# Patient Record
Sex: FEMALE | Race: WHITE | HISPANIC OR LATINO
[De-identification: names, ages, dates, MRNs, and addresses within clinical notes are randomized per-mention and may not be internally consistent; named-entity substitution may affect disease eponyms.]

---

## 2017-03-13 ENCOUNTER — APPOINTMENT (OUTPATIENT)
Dept: UROGYNECOLOGY | Facility: CLINIC | Age: 77
End: 2017-03-13

## 2017-03-27 ENCOUNTER — APPOINTMENT (OUTPATIENT)
Dept: UROGYNECOLOGY | Facility: CLINIC | Age: 77
End: 2017-03-27

## 2017-03-27 DIAGNOSIS — N81.11 CYSTOCELE, MIDLINE: ICD-10-CM

## 2017-03-27 DIAGNOSIS — N99.3 PROLAPSE OF VAGINAL VAULT AFTER HYSTERECTOMY: ICD-10-CM

## 2018-04-29 ENCOUNTER — EMERGENCY (EMERGENCY)
Facility: HOSPITAL | Age: 78
LOS: 1 days | Discharge: HOME | End: 2018-04-29
Attending: EMERGENCY MEDICINE
Payer: MEDICARE

## 2018-04-29 VITALS
RESPIRATION RATE: 20 BRPM | SYSTOLIC BLOOD PRESSURE: 157 MMHG | TEMPERATURE: 98 F | DIASTOLIC BLOOD PRESSURE: 79 MMHG | HEART RATE: 70 BPM | OXYGEN SATURATION: 100 % | WEIGHT: 110.89 LBS

## 2018-04-29 VITALS
DIASTOLIC BLOOD PRESSURE: 74 MMHG | RESPIRATION RATE: 18 BRPM | HEART RATE: 72 BPM | SYSTOLIC BLOOD PRESSURE: 138 MMHG | OXYGEN SATURATION: 99 %

## 2018-04-29 DIAGNOSIS — Z90.710 ACQUIRED ABSENCE OF BOTH CERVIX AND UTERUS: Chronic | ICD-10-CM

## 2018-04-29 PROCEDURE — 12013 RPR F/E/E/N/L/M 2.6-5.0 CM: CPT

## 2018-04-29 PROCEDURE — 73562 X-RAY EXAM OF KNEE 3: CPT

## 2018-04-29 PROCEDURE — 73562 X-RAY EXAM OF KNEE 3: CPT | Mod: 26,RT

## 2018-04-29 PROCEDURE — 90715 TDAP VACCINE 7 YRS/> IM: CPT

## 2018-04-29 PROCEDURE — 70450 CT HEAD/BRAIN W/O DYE: CPT

## 2018-04-29 PROCEDURE — 99283 EMERGENCY DEPT VISIT LOW MDM: CPT | Mod: 25

## 2018-04-29 PROCEDURE — 70450 CT HEAD/BRAIN W/O DYE: CPT | Mod: 26

## 2018-04-29 PROCEDURE — 90471 IMMUNIZATION ADMIN: CPT

## 2018-04-29 RX ORDER — TETANUS TOXOID, REDUCED DIPHTHERIA TOXOID AND ACELLULAR PERTUSSIS VACCINE, ADSORBED 5; 2.5; 8; 8; 2.5 [IU]/.5ML; [IU]/.5ML; UG/.5ML; UG/.5ML; UG/.5ML
0.5 SUSPENSION INTRAMUSCULAR ONCE
Qty: 0 | Refills: 0 | Status: COMPLETED | OUTPATIENT
Start: 2018-04-29 | End: 2018-04-29

## 2018-04-29 RX ADMIN — TETANUS TOXOID, REDUCED DIPHTHERIA TOXOID AND ACELLULAR PERTUSSIS VACCINE, ADSORBED 0.5 MILLILITER(S): 5; 2.5; 8; 8; 2.5 SUSPENSION INTRAMUSCULAR at 13:13

## 2018-04-29 NOTE — ED PROVIDER NOTE - PHYSICAL EXAMINATION
Gen: NAD, AOx3  Head: abrasion/laceration present to right forehead  HEENT: PERRL, oral mucosa moist, normal conjunctiva, oropharynx clear without exudate or erythema  Lung: CTAB, no respiratory distress, no wheezing, rales, rhonchi  CV: normal s1/s2, rrr, no murmurs, Normal perfusion, pulses 2+ throughout  Abd: soft, NTND, no CVA tenderness  MSK: No edema, no visible deformities, full range of motion in all 4 extremities  Neuro: No cervical midline tenderness, CN II-XII grossly intact, No focal neurologic deficits, strength 5/5 throughout  Skin: 3 superficial 1cm lacerations and 1 2cm deeper laceration to frontal right scalp, not actively bleeding, right knee small abrasions present, no ecchymoses  Psych: normal affect Gen: NAD, AOx3  Head: abrasion/laceration present to right forehead  HEENT: PERRL, oral mucosa moist, normal conjunctiva, oropharynx clear without exudate or erythema  Lung: CTAB, no respiratory distress, no wheezing, rales, rhonchi  CV: normal s1/s2, rrr, no murmurs, Normal perfusion, pulses 2+ throughout  Abd: soft, NTND, no CVA tenderness  MSK: No edema, no visible deformities, full range of motion in all 4 extremities  Neuro: No cervical midline tenderness, CN II-XII grossly intact, No focal neurologic deficits, strength 5/5 throughout  Skin: 3 superficial 1cm lacerations and 1 2cm deeper laceration to frontal right scalp, not actively bleeding, right knee small abrasions present, no ecchymoses  Psych: normal affect  Attending Pickard: Gen: NAD, heent: atrauamtic, eomi, perrla, mmm, op pink, uvula midline, neck; nttp, no nuchal rigidity, chest: nttp, no crepitus, cv: rrr, no murmurs, lungs: ctab, abd: soft, nontender, nondistended, no peritoneal signs, +BS, no guarding, ext: wwp, neg homans, skin: multiple abrasions, laceration to right forehead, neuro: awake and alert, following commands, speech clear, sensation and strength intact, no focal deficits

## 2018-04-29 NOTE — ED PROVIDER NOTE - MEDICAL DECISION MAKING DETAILS
78yo female with head injury and right knee pain s/p mechanical fall, will check CT, xrays, give tetanus, laceration repair, DC. Jennifer Turner DO 76yo female with head injury and right knee pain s/p mechanical fall, will check CT, xrays, give tetanus, laceration repair, DC. Jennifer Turner DO  Attending Neeraj: 76 y/o female s/p mechanical trip and fall. no loc. upon arrival pt awake and alert without focal neurologic deficits. abd soft and nontender and pt hemodynamically stable. no midline neck ttp. will obtain ct head, update tetanus, repair laceration and give wound care instructions

## 2018-04-29 NOTE — ED PROVIDER NOTE - PLAN OF CARE
1. Follow up with your primary care physician within 2-3days for reevaluation.  2.  Return to the Emergency Department for worsening, progressive or any other concerning symptoms.   3.  Please take tylenol 650 mg every 4 hours as needed for pain. Please do not exceed more than 4,000mg of Tylenol in a day   4.  Please have your sutures removed in 5 days. You may have this done at your doctor's office, urgent care, or in the emergency department.

## 2018-04-29 NOTE — ED ADULT NURSE NOTE - OBJECTIVE STATEMENT
77 y.o F arrived to ED s/p mechanical fall outside onto asphalt. A&Ox3. pt accompanied by her daughters. pt daughter states pt tripped over her flip flop and hit R side of her body. impact to R side of forehead, has an abrasion to R forehead, endorses pain to R forehead and R knee. no LOC no blood thinners. able to ambulate s/p fall pmh hypothyroid, arthritis. respirations nonlabored, pt in no acute distress, abdomen soft nontender, neuro intact, moves all extremities. Denies CP, SOB, N/V/D, double vision. Safety and comfort provided maintained.

## 2018-04-29 NOTE — ED PROVIDER NOTE - OBJECTIVE STATEMENT
78yo female PMH hypothryoid, osteoporosis, arthritis p/w head injury and right knee pain s/p mechanical fall. Patient was wearing flip flops and tripped and fell onto her face and right knee, hit the asphalt. No loss of consciousness. Able to get up on her own and ambulated to house. No blurred vision or double vision, no chest pain or shortness of breath, no nausea, vomiting. Last tetanus unknown.

## 2018-04-29 NOTE — ED PROVIDER NOTE - CARE PLAN
Principal Discharge DX:	Laceration of face  Assessment and plan of treatment:	1. Follow up with your primary care physician within 2-3days for reevaluation.  2.  Return to the Emergency Department for worsening, progressive or any other concerning symptoms.   3.  Please take tylenol 650 mg every 4 hours as needed for pain. Please do not exceed more than 4,000mg of Tylenol in a day   4.  Please have your sutures removed in 5 days. You may have this done at your doctor's office, urgent care, or in the emergency department.  Secondary Diagnosis:	Knee pain, right

## 2018-05-01 ENCOUNTER — OUTPATIENT (OUTPATIENT)
Dept: OUTPATIENT SERVICES | Facility: HOSPITAL | Age: 78
LOS: 1 days | End: 2018-05-01
Payer: MEDICAID

## 2018-05-01 DIAGNOSIS — Z90.710 ACQUIRED ABSENCE OF BOTH CERVIX AND UTERUS: Chronic | ICD-10-CM

## 2018-05-01 PROCEDURE — G9001: CPT

## 2018-05-07 ENCOUNTER — EMERGENCY (EMERGENCY)
Facility: HOSPITAL | Age: 78
LOS: 1 days | Discharge: HOME | End: 2018-05-07
Attending: EMERGENCY MEDICINE
Payer: MEDICARE

## 2018-05-07 VITALS
DIASTOLIC BLOOD PRESSURE: 68 MMHG | OXYGEN SATURATION: 97 % | RESPIRATION RATE: 18 BRPM | SYSTOLIC BLOOD PRESSURE: 112 MMHG | HEART RATE: 82 BPM

## 2018-05-07 DIAGNOSIS — Z90.710 ACQUIRED ABSENCE OF BOTH CERVIX AND UTERUS: Chronic | ICD-10-CM

## 2018-05-07 PROCEDURE — G0463: CPT

## 2018-05-07 NOTE — ED PROVIDER NOTE - ENMT, MLM
(+) closed wound to right frontal head w/ sutures intact. no signs of infection, erythema, swelling, discharge or tenderness noted. Airway patent, Nasal mucosa clear. Mouth with normal mucosa. Throat has no vesicles, no oropharyngeal exudates and uvula is midline.

## 2018-05-07 NOTE — ED PROVIDER NOTE - ATTENDING CONTRIBUTION TO CARE
Attending MD Vega:   I personally have seen and examined this patient.  Physician assistant note reviewed and agree on plan of care and except where noted.  See HPI, PE, and MDM for details.       Attending MD Vega: A & O x 3, NAD, right forehead healing laceration ~3cm, no surrounding erythema, warmth or ttp, affect appropriate. neuro exam non focal with no motor or sensory deficits.       Pt here for suture removal right forehead, wound well healed, sutures removed as per procedure note. Ongoing wound care explained to patient

## 2018-05-07 NOTE — ED PROCEDURE NOTE - ATTENDING CONTRIBUTION TO CARE
Attending MD Vega: Risks, benefit and alternatives of procedure explained to patient and patient demonstrated verbal understanding and consent.  I was present during the key portions of the procedure.

## 2018-05-07 NOTE — ED PROCEDURE NOTE - CPROC ED SUTURE REMOV DETAILS1
The location identified, area draped and prepped as per norm, sterile technique maintained./right frontal

## 2018-05-07 NOTE — ED PROVIDER NOTE - MEDICAL DECISION MAKING DETAILS
78yo female aox3 suture removal s/p mechanical floor w/ laceration 04/29/18  No signs of neuro deficit  Suture removal  F/U with PCP. If symptoms, return to ED.

## 2018-05-07 NOTE — ED PROVIDER NOTE - OBJECTIVE STATEMENT
Patient is 78 yo female presents to ED requesting suture removal to head s/p head injury due to mechanical fall 04/29/18. Pt states she is feeling better and pain has resolved. Pt denies swelling to area, discharge, fever, chills, nausea, vomiting, numbness, weakness, dizziness, subjective neurosensory deficit or other associated symptoms.

## 2018-05-09 DIAGNOSIS — R69 ILLNESS, UNSPECIFIED: ICD-10-CM

## 2018-09-13 ENCOUNTER — INPATIENT (INPATIENT)
Facility: HOSPITAL | Age: 78
LOS: 1 days | Discharge: HOME | DRG: 552 | End: 2018-09-15
Attending: NEUROLOGICAL SURGERY | Admitting: NEUROLOGICAL SURGERY
Payer: MEDICARE

## 2018-09-13 VITALS
WEIGHT: 111.99 LBS | RESPIRATION RATE: 18 BRPM | TEMPERATURE: 98 F | OXYGEN SATURATION: 99 % | DIASTOLIC BLOOD PRESSURE: 71 MMHG | HEART RATE: 78 BPM | SYSTOLIC BLOOD PRESSURE: 131 MMHG

## 2018-09-13 DIAGNOSIS — Z90.710 ACQUIRED ABSENCE OF BOTH CERVIX AND UTERUS: Chronic | ICD-10-CM
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
## 2018-09-13 DIAGNOSIS — S12.01XA STABLE BURST FRACTURE OF FIRST CERVICAL VERTEBRA, INITIAL ENCOUNTER FOR CLOSED FRACTURE: ICD-10-CM

## 2018-09-13 DIAGNOSIS — M81.0 AGE-RELATED OSTEOPOROSIS WITHOUT CURRENT PATHOLOGICAL FRACTURE: ICD-10-CM

## 2018-09-13 DIAGNOSIS — E78.5 HYPERLIPIDEMIA, UNSPECIFIED: ICD-10-CM

## 2018-09-13 DIAGNOSIS — E03.9 HYPOTHYROIDISM, UNSPECIFIED: ICD-10-CM

## 2018-09-13 DIAGNOSIS — Z01.818 ENCOUNTER FOR OTHER PREPROCEDURAL EXAMINATION: ICD-10-CM

## 2018-09-13 DIAGNOSIS — E87.1 HYPO-OSMOLALITY AND HYPONATREMIA: ICD-10-CM

## 2018-09-13 LAB
ALBUMIN SERPL ELPH-MCNC: 4.5 G/DL — SIGNIFICANT CHANGE UP (ref 3.3–5)
ALP SERPL-CCNC: 59 U/L — SIGNIFICANT CHANGE UP (ref 40–120)
ALT FLD-CCNC: 24 U/L — SIGNIFICANT CHANGE UP (ref 10–45)
ANION GAP SERPL CALC-SCNC: 13 MMOL/L — SIGNIFICANT CHANGE UP (ref 5–17)
APPEARANCE UR: ABNORMAL
APTT BLD: 28 SEC — SIGNIFICANT CHANGE UP (ref 27.5–37.4)
AST SERPL-CCNC: 26 U/L — SIGNIFICANT CHANGE UP (ref 10–40)
BILIRUB SERPL-MCNC: 0.5 MG/DL — SIGNIFICANT CHANGE UP (ref 0.2–1.2)
BILIRUB UR-MCNC: NEGATIVE — SIGNIFICANT CHANGE UP
BLD GP AB SCN SERPL QL: NEGATIVE — SIGNIFICANT CHANGE UP
BUN SERPL-MCNC: 10 MG/DL — SIGNIFICANT CHANGE UP (ref 7–23)
CALCIUM SERPL-MCNC: 9.1 MG/DL — SIGNIFICANT CHANGE UP (ref 8.4–10.5)
CHLORIDE SERPL-SCNC: 95 MMOL/L — LOW (ref 96–108)
CO2 SERPL-SCNC: 24 MMOL/L — SIGNIFICANT CHANGE UP (ref 22–31)
COLOR SPEC: SIGNIFICANT CHANGE UP
CREAT SERPL-MCNC: 0.45 MG/DL — LOW (ref 0.5–1.3)
DIFF PNL FLD: NEGATIVE — SIGNIFICANT CHANGE UP
GAS PNL BLDV: SIGNIFICANT CHANGE UP
GLUCOSE SERPL-MCNC: 122 MG/DL — HIGH (ref 70–99)
GLUCOSE UR QL: NEGATIVE — SIGNIFICANT CHANGE UP
HCT VFR BLD CALC: 41 % — SIGNIFICANT CHANGE UP (ref 34.5–45)
HGB BLD-MCNC: 13.6 G/DL — SIGNIFICANT CHANGE UP (ref 11.5–15.5)
INR BLD: 0.96 RATIO — SIGNIFICANT CHANGE UP (ref 0.88–1.16)
KETONES UR-MCNC: NEGATIVE — SIGNIFICANT CHANGE UP
LEUKOCYTE ESTERASE UR-ACNC: ABNORMAL
MCHC RBC-ENTMCNC: 26.4 PG — LOW (ref 27–34)
MCHC RBC-ENTMCNC: 33.3 GM/DL — SIGNIFICANT CHANGE UP (ref 32–36)
MCV RBC AUTO: 79.2 FL — LOW (ref 80–100)
NITRITE UR-MCNC: NEGATIVE — SIGNIFICANT CHANGE UP
PH UR: 7.5 — SIGNIFICANT CHANGE UP (ref 5–8)
PLATELET # BLD AUTO: 201 K/UL — SIGNIFICANT CHANGE UP (ref 150–400)
POTASSIUM SERPL-MCNC: 3.9 MMOL/L — SIGNIFICANT CHANGE UP (ref 3.5–5.3)
POTASSIUM SERPL-SCNC: 3.9 MMOL/L — SIGNIFICANT CHANGE UP (ref 3.5–5.3)
PROT SERPL-MCNC: 7.4 G/DL — SIGNIFICANT CHANGE UP (ref 6–8.3)
PROT UR-MCNC: NEGATIVE — SIGNIFICANT CHANGE UP
PROTHROM AB SERPL-ACNC: 10.4 SEC — SIGNIFICANT CHANGE UP (ref 9.8–12.7)
RBC # BLD: 5.17 M/UL — SIGNIFICANT CHANGE UP (ref 3.8–5.2)
RBC # FLD: 13.9 % — SIGNIFICANT CHANGE UP (ref 10.3–14.5)
RH IG SCN BLD-IMP: POSITIVE — SIGNIFICANT CHANGE UP
SODIUM SERPL-SCNC: 132 MMOL/L — LOW (ref 135–145)
SP GR SPEC: 1.01 — SIGNIFICANT CHANGE UP (ref 1.01–1.02)
UROBILINOGEN FLD QL: NEGATIVE — SIGNIFICANT CHANGE UP
WBC # BLD: 7.4 K/UL — SIGNIFICANT CHANGE UP (ref 3.8–10.5)
WBC # FLD AUTO: 7.4 K/UL — SIGNIFICANT CHANGE UP (ref 3.8–10.5)

## 2018-09-13 PROCEDURE — 72125 CT NECK SPINE W/O DYE: CPT | Mod: 26

## 2018-09-13 PROCEDURE — 99223 1ST HOSP IP/OBS HIGH 75: CPT

## 2018-09-13 PROCEDURE — 70450 CT HEAD/BRAIN W/O DYE: CPT | Mod: 26

## 2018-09-13 PROCEDURE — 93010 ELECTROCARDIOGRAM REPORT: CPT

## 2018-09-13 PROCEDURE — 99285 EMERGENCY DEPT VISIT HI MDM: CPT | Mod: 25

## 2018-09-13 RX ORDER — OXYCODONE HYDROCHLORIDE 5 MG/1
5 TABLET ORAL EVERY 6 HOURS
Qty: 0 | Refills: 0 | Status: DISCONTINUED | OUTPATIENT
Start: 2018-09-13 | End: 2018-09-15

## 2018-09-13 RX ORDER — INFLUENZA VIRUS VACCINE 15; 15; 15; 15 UG/.5ML; UG/.5ML; UG/.5ML; UG/.5ML
0.5 SUSPENSION INTRAMUSCULAR ONCE
Qty: 0 | Refills: 0 | Status: DISCONTINUED | OUTPATIENT
Start: 2018-09-13 | End: 2018-09-15

## 2018-09-13 RX ORDER — ACETAMINOPHEN 500 MG
650 TABLET ORAL ONCE
Qty: 0 | Refills: 0 | Status: COMPLETED | OUTPATIENT
Start: 2018-09-13 | End: 2018-09-13

## 2018-09-13 RX ORDER — OXYCODONE HYDROCHLORIDE 5 MG/1
10 TABLET ORAL EVERY 6 HOURS
Qty: 0 | Refills: 0 | Status: DISCONTINUED | OUTPATIENT
Start: 2018-09-13 | End: 2018-09-15

## 2018-09-13 RX ORDER — ACETAMINOPHEN 500 MG
650 TABLET ORAL EVERY 6 HOURS
Qty: 0 | Refills: 0 | Status: DISCONTINUED | OUTPATIENT
Start: 2018-09-13 | End: 2018-09-15

## 2018-09-13 RX ORDER — SENNA PLUS 8.6 MG/1
2 TABLET ORAL AT BEDTIME
Qty: 0 | Refills: 0 | Status: DISCONTINUED | OUTPATIENT
Start: 2018-09-13 | End: 2018-09-15

## 2018-09-13 RX ORDER — LEVOTHYROXINE SODIUM 125 MCG
50 TABLET ORAL DAILY
Qty: 0 | Refills: 0 | Status: DISCONTINUED | OUTPATIENT
Start: 2018-09-13 | End: 2018-09-15

## 2018-09-13 RX ORDER — DEXTROSE MONOHYDRATE, SODIUM CHLORIDE, AND POTASSIUM CHLORIDE 50; .745; 4.5 G/1000ML; G/1000ML; G/1000ML
1000 INJECTION, SOLUTION INTRAVENOUS
Qty: 0 | Refills: 0 | Status: DISCONTINUED | OUTPATIENT
Start: 2018-09-13 | End: 2018-09-14

## 2018-09-13 RX ORDER — DOCUSATE SODIUM 100 MG
100 CAPSULE ORAL THREE TIMES A DAY
Qty: 0 | Refills: 0 | Status: DISCONTINUED | OUTPATIENT
Start: 2018-09-13 | End: 2018-09-15

## 2018-09-13 RX ORDER — ONDANSETRON 8 MG/1
4 TABLET, FILM COATED ORAL EVERY 6 HOURS
Qty: 0 | Refills: 0 | Status: DISCONTINUED | OUTPATIENT
Start: 2018-09-13 | End: 2018-09-15

## 2018-09-13 RX ORDER — ENOXAPARIN SODIUM 100 MG/ML
40 INJECTION SUBCUTANEOUS EVERY 24 HOURS
Qty: 0 | Refills: 0 | Status: DISCONTINUED | OUTPATIENT
Start: 2018-09-13 | End: 2018-09-15

## 2018-09-13 RX ORDER — ATORVASTATIN CALCIUM 80 MG/1
10 TABLET, FILM COATED ORAL AT BEDTIME
Qty: 0 | Refills: 0 | Status: DISCONTINUED | OUTPATIENT
Start: 2018-09-13 | End: 2018-09-15

## 2018-09-13 RX ORDER — LIDOCAINE 4 G/100G
1 CREAM TOPICAL ONCE
Qty: 0 | Refills: 0 | Status: DISCONTINUED | OUTPATIENT
Start: 2018-09-13 | End: 2018-09-13

## 2018-09-13 RX ORDER — MORPHINE SULFATE 50 MG/1
4 CAPSULE, EXTENDED RELEASE ORAL ONCE
Qty: 0 | Refills: 0 | Status: DISCONTINUED | OUTPATIENT
Start: 2018-09-13 | End: 2018-09-13

## 2018-09-13 RX ADMIN — Medication 650 MILLIGRAM(S): at 09:25

## 2018-09-13 RX ADMIN — ATORVASTATIN CALCIUM 10 MILLIGRAM(S): 80 TABLET, FILM COATED ORAL at 21:07

## 2018-09-13 RX ADMIN — Medication 650 MILLIGRAM(S): at 08:58

## 2018-09-13 RX ADMIN — DEXTROSE MONOHYDRATE, SODIUM CHLORIDE, AND POTASSIUM CHLORIDE 75 MILLILITER(S): 50; .745; 4.5 INJECTION, SOLUTION INTRAVENOUS at 11:48

## 2018-09-13 RX ADMIN — Medication 650 MILLIGRAM(S): at 21:07

## 2018-09-13 RX ADMIN — Medication 650 MILLIGRAM(S): at 22:00

## 2018-09-13 NOTE — H&P ADULT - NSHPPHYSICALEXAM_GEN_ALL_CORE
PHYSICAL EXAMINATION:   T(C): 36.7 (09-13-18 @ 08:00), Max: 36.7 (09-13-18 @ 08:00)  HR: 75 (09-13-18 @ 08:00) (75 - 78)  BP: 145/81 (09-13-18 @ 08:00) (131/71 - 145/81)  RR: 18 (09-13-18 @ 08:00) (18 - 18)  SpO2: 98% (09-13-18 @ 08:00) (98% - 99%)  Wt(kg): --  Weight (kg): 50.8 (09-13 @ 07:28)    General Examination:     Neurologic Examination:           PHYSICAL EXAM:    Constitutional: No Acute Distress     Neurological: AOx3, Following Commands    Motor exam:          Upper extremity                         Delt     Bicep     Tricep    HG                                                 R         5/5        5/5        5/5       5/5                                               L          5/5        5/5        5/5       5/5          Lower extremity                        HF         KF        KE       DF         PF                                                  R        5/5        5/5        5/5       5/5         5/5                                               L         5/5        5/5       5/5       5/5          5/5                                                 Sensation: [x] intact to light touch  [] decreased:     No clonus/hoffmans

## 2018-09-13 NOTE — ED ADULT NURSE REASSESSMENT NOTE - NS ED NURSE REASSESS COMMENT FT1
CT dx Type II Dens fx. Neuro at bedside now CT dx Type II Dens fx. [place] J collar placed by MD. Neuro at bedside now

## 2018-09-13 NOTE — ED PROVIDER NOTE - OBJECTIVE STATEMENT
78 F coming in with a mechanical fall down 6 stairs on tuesday. Comes in today because pain has not gotten better after trying ice but no medications. Per pt she has "bad balance" and this is why she fell. SHe does not recall what part of her body she fell onto despite denying LoC; she recalls immediately yelling for help and her family that she lives with came to get her and put her into bed. Now c/o L face, bilateral neck, and finger pain, and a sensation of ear and nose congestion that started since the fall. Denies numbness anywher ein the body urinary incont or retention, Cp/SoB, back pain, dizziness assoc with the fall. Not on any blood thinners. Has been carrying out daily activities since fall

## 2018-09-13 NOTE — ED PROVIDER NOTE - GASTROINTESTINAL NEGATIVE STATEMENT, MLM
no abdominal pain, no bloating, no constipation, no diarrhea, no nausea and no vomiting. details… detailed exam

## 2018-09-13 NOTE — ED ADULT NURSE NOTE - OBJECTIVE STATEMENT
78y female coming in from home s/p fall. A&Ox3 and VSS. Lithuanian speaking.  service used. Hx of HLD, hypothyroidism and osteoperosis. Pt reports fall down 6 stairs this morning. Pt reports she lost her balance, denies dizziness prior to fall. Pt reports hitting head, denies LOC and blood thinner use. Upon arrival to ED, pt placed in C-collar. C/o neck pain and right 4th finger pain 8/10. Ecchymoses noted to below left eye and 4th right finger. Denies chest pain, SOB, fever/chills, n/v/d. 78y female coming in from home s/p fall. A&Ox3 and VSS. Turkmen speaking.  service used with resident at bedside. Hx of HLD, hypothyroidism and osteoperosis. Pt reports fall down 6 stairs on Tuesday, unable to recall what part of her body she fell on. Pt reports she lost her balance, denies dizziness prior to fall. Pt reports hitting head, denies LOC and blood thinner use. Pt reports pain increasing despite ice application. Upon arrival to ED, pt placed in C-collar. C/o neck pain and right 4th finger pain 8/10. Ecchymoses noted to below left eye and 4th right finger. Denies chest pain, SOB, fever/chills, n/v/d.

## 2018-09-13 NOTE — ED ADULT TRIAGE NOTE - CHIEF COMPLAINT QUOTE
fell from 6 stairs. Pt missed the step lost balance & fell C/O  pain in head & neck  right fourth finger 62

## 2018-09-13 NOTE — ED PROVIDER NOTE - MEDICAL DECISION MAKING DETAILS
Pt with mechanical fall and possible head trauma several days ago, has been walking around since then. Exam with signs of trauma to head, finger, shin. Will CT scan head and neck to evaluate for cranial bleedign, C-spine trauma, proivde analgesia. [place]: Pt with mechanical fall and possible head trauma several days ago, has been walking around since then. Exam with signs of trauma to head, finger, shin. Will CT scan head and neck to evaluate for cranial bleedign, C-spine trauma, proivde analgesia.

## 2018-09-13 NOTE — ED ADULT NURSE NOTE - NSIMPLEMENTINTERV_GEN_ALL_ED
Implemented All Fall with Harm Risk Interventions:  [place] to call system. Call bell, personal items and telephone within reach. Instruct patient to call for assistance. Room bathroom lighting operational. Non-slip footwear when patient is off stretcher. Physically safe environment: no spills, clutter or unnecessary equipment. Stretcher in lowest position, wheels locked, appropriate side rails in place. Provide visual cue, wrist band, yellow gown, etc. Monitor gait and stability. Monitor for mental status changes and reorient to person, place, and time. Review medications for side effects contributing to fall risk. Reinforce activity limits and safety measures with patient and family. Provide visual clues: red socks.

## 2018-09-13 NOTE — H&P ADULT - HISTORY OF PRESENT ILLNESS
p ([phone])     HPI: Niya Deleon, 78 F, PMH of HLP, hypothyroid,  coming in with a mechanical fall down 6 stairs on tuesday. Comes in today because pain has not gotten better. Denies radiculopathy, numbness, paresthesias, weakness. CT showed significant type 2 dens fx with c1 arch fx.

## 2018-09-13 NOTE — ED ADULT NURSE REASSESSMENT NOTE - NS ED NURSE REASSESS COMMENT FT1
Pt denies pain. Repeat vital signs and neuro check performed. Family at bedside. Admitted to surgery, RTM. No acute distress noted. Will continue to monitor.

## 2018-09-13 NOTE — ED ADULT NURSE REASSESSMENT NOTE - NS ED NURSE REASSESS COMMENT FT1
Bloodwork drawn and sent to lab. Type and screen drawn and sent to blood bank. Pt c/o increasing pain. MD notified.

## 2018-09-13 NOTE — ED ADULT NURSE REASSESSMENT NOTE - NS ED NURSE REASSESS COMMENT FT1
Pt denies pain at this time. Refused morphine. Pt admitted to surgery, RTM. Please see paper chart for neuro checks. Will continue to monitor.

## 2018-09-13 NOTE — CONSULT NOTE ADULT - PROBLEM SELECTOR RECOMMENDATION 9
type 2 dens fracture with C1 arch fracture   C-collar   MRI C-spine   Pain control   OR as per neurosurgery

## 2018-09-13 NOTE — CONSULT NOTE ADULT - PROBLEM SELECTOR RECOMMENDATION 6
RCRI of 0, 0.4% risk of major cardiac event   F/up EKG   METS>4 and no cardiac symptoms   If EKG unremarkable, then no further w/up needed RCRI of 0, 0.4% risk of major cardiac event   METS>4 and no cardiac symptoms   EKG with no acute ischemic changes   No further testing required, patient optimized and may proceed w/ OR as planned

## 2018-09-13 NOTE — H&P ADULT - NSHPREVIEWOFSYSTEMS_GEN_ALL_CORE
REVIEW OF SYSTEMS:  Check here if all are normal other than Neurological/HPI [x]  General:  Eyes:  ENT:  Cardiac:  Respiratory:  GI:  Musculoskeletal:   Skin:  Neurologic:   Psychiatric:

## 2018-09-13 NOTE — CONSULT NOTE ADULT - ASSESSMENT
78F h/o HLD, hypothyroid, osteoporosis, coming in with neck pain after a mechanical fall, found to have type 2 dens fracture with C1 arch fx.

## 2018-09-13 NOTE — ED PROVIDER NOTE - CARE PLAN
Principal Discharge DX:	Fall, initial encounter Principal Discharge DX:	Dens fracture  Secondary Diagnosis:	Closed stable burst fracture of first cervical vertebra, initial encounter

## 2018-09-13 NOTE — ED PROVIDER NOTE - PHYSICAL EXAMINATION
General: Alert and Orientated x 3. No apparent distress.  Head: Normocephalic, bruising to L forehead, maxillary regions.  Eyes: PERRLA with EOMI.  Neck: Supple. Trachea midline.   Cardiac: Normal S1 and S2 w/ RRR. No murmurs appreciated. No JVD appreciated. Strong peripheral pulses.  Pulmonary: Vesicular breath sounds bilaterally. No increased WOB. No wheezes or crackles.  Abdominal: Soft, non-tender.  Neurologic: No focal sensory or motor deficits. Str 5/5 in all 4 ext. SILT throughout UE and LE. CN 2-12 intact. Finger to nose intact.   Musculoskeletal: Strength appropriate in all 4 extremities for age with no limited ROM. L ring finger hematoma under DIP joint. Abrasion to L shin. B/l paraspinal neck muscle tenderness. No vertebral tenderness throughout.  Skin: Color appropriate for race. Intact, warm, and well-perfused.  Psychiatric: Appropriate mood and affect. No apparent risk to self or others.

## 2018-09-13 NOTE — CONSULT NOTE ADULT - SUBJECTIVE AND OBJECTIVE BOX
HPI: 78F h/o HLD, hypothyroid, osteoporosis, coming in with neck pain after a mechanical fall. Pt says she was loss her balance and fell down 6 stairs on Tuesday, denies LOC but did hit the back of her neck. Pt started having posterior neck pain since the fall, constant, radiates up into her ears, 10 out of 10 on pain scale, dull, worse w/ movement, Advil/Tylenol did not help. Denies radiculopathy, numbness, paresthesias, weakness. CT showed significant type 2 dens fx with c1 arch fx.     PAST MEDICAL & SURGICAL HISTORY:  Hypothyroid  Arthritis  Osteoporosis  HLD (hyperlipidemia)  S/P hysterectomy  No Past Surgical History      Review of Systems:   CONSTITUTIONAL: No fever, weight loss, or fatigue  EYES: No eye pain, visual disturbances, or discharge  ENMT:  No difficulty hearing, tinnitus, vertigo; No sinus or throat pain  NECK: No pain or stiffness  BREASTS: No pain, masses, or nipple discharge  RESPIRATORY: No cough, wheezing, chills or hemoptysis; No shortness of breath  CARDIOVASCULAR: No chest pain, palpitations, dizziness, or leg swelling  GASTROINTESTINAL: No abdominal or epigastric pain. No nausea, vomiting, or hematemesis; No diarrhea or constipation. No melena or hematochezia.  GENITOURINARY: No dysuria, frequency, hematuria, or incontinence  NEUROLOGICAL: No headaches, memory loss, loss of strength, numbness, or tremors  SKIN: No itching, burning, rashes, or lesions   LYMPH NODES: No enlarged glands  ENDOCRINE: No heat or cold intolerance; No hair loss  MUSCULOSKELETAL: +Neck pain.   PSYCHIATRIC: No depression, anxiety, mood swings, or difficulty sleeping  HEME/LYMPH: No easy bruising, or bleeding gums  ALLERY AND IMMUNOLOGIC: No hives or eczema    Allergies    No Known Allergies    Intolerances    Social History:   No smoking  No alcohol     FAMILY HISTORY:  Mother had diabetes       MEDICATIONS  (STANDING):  Lipitor 20mg PO qhs  Levothyroxine 75mcg PO daily   Omeprazole 40mg PO daily   Fosamax qweekly       Vital Signs Last 24 Hrs  T(C): 37 (13 Sep 2018 11:07), Max: 37 (13 Sep 2018 11:07)  T(F): 98.6 (13 Sep 2018 11:07), Max: 98.6 (13 Sep 2018 11:07)  HR: 75 (13 Sep 2018 11:07) (75 - 78)  BP: 145/85 (13 Sep 2018 11:07) (131/71 - 145/85)  BP(mean): --  RR: 18 (13 Sep 2018 11:07) (18 - 18)  SpO2: 99% (13 Sep 2018 11:07) (98% - 99%)  CAPILLARY BLOOD GLUCOSE        I&O's Summary      PHYSICAL EXAM:  GENERAL: NAD  HEAD:  Atraumatic, Normocephalic  EYES: EOMI, PERRLA, conjunctiva and sclera clear  NECK: No JVD, c-collar  CHEST/LUNG: Clear to auscultation bilaterally; No wheeze  HEART: Regular rate and rhythm; S1S2  ABDOMEN: Soft, Nontender, Nondistended; Bowel sounds present  EXTREMITIES:  2+ Peripheral Pulses, No clubbing, cyanosis, or edema  PSYCH: AAOx3  NEUROLOGY: CN's intact, moves all extremities   SKIN: No rashes or lesions    LABS:                        13.6   7.4   )-----------( 201      ( 13 Sep 2018 11:22 )             41.0     09-13    132<L>  |  95<L>  |  10  ----------------------------<  122<H>  3.9   |  24  |  0.45<L>    Ca    9.1      13 Sep 2018 11:22    TPro  7.4  /  Alb  4.5  /  TBili  0.5  /  DBili  x   /  AST  26  /  ALT  24  /  AlkPhos  59  09-13    PT/INR - ( 13 Sep 2018 11:22 )   PT: 10.4 sec;   INR: 0.96 ratio         PTT - ( 13 Sep 2018 11:22 )  PTT:28.0 sec          RADIOLOGY & ADDITIONAL TESTS:    Imaging Personally Reviewed:  Head CT: No acute intracranial hemorrhage, mass effect, or shift of the   midline structures.    Small to moderate-sized left frontal scalp hematoma.    Similar-appearing microvascular disease.    Cervical spine CT: Acute type II dens fracture and associated anterior   and posterior neural arch fractures of C1 with associated retropharyngeal   edema.    EKG ordered     Consultant(s) Notes Reviewed:      Care Discussed with Consultants/Other Providers: HPI: 78F h/o HLD, hypothyroid, osteoporosis, coming in with neck pain after a mechanical fall. Pt says she was loss her balance and fell down 6 stairs on Tuesday, denies LOC but did hit the back of her neck. Pt started having posterior neck pain since the fall, constant, radiates up into her ears, 10 out of 10 on pain scale, dull, worse w/ movement, Advil/Tylenol did not help. Denies radiculopathy, numbness, paresthesias, weakness. CT showed significant type 2 dens fx with c1 arch fx.     PAST MEDICAL & SURGICAL HISTORY:  Hypothyroid  Arthritis  Osteoporosis  HLD (hyperlipidemia)  S/P hysterectomy  No Past Surgical History      Review of Systems:   CONSTITUTIONAL: No fever, weight loss, or fatigue  EYES: No eye pain, visual disturbances, or discharge  ENMT:  No difficulty hearing, tinnitus, vertigo; No sinus or throat pain  NECK: No pain or stiffness  BREASTS: No pain, masses, or nipple discharge  RESPIRATORY: No cough, wheezing, chills or hemoptysis; No shortness of breath  CARDIOVASCULAR: No chest pain, palpitations, dizziness, or leg swelling  GASTROINTESTINAL: No abdominal or epigastric pain. No nausea, vomiting, or hematemesis; No diarrhea or constipation. No melena or hematochezia.  GENITOURINARY: No dysuria, frequency, hematuria, or incontinence  NEUROLOGICAL: No headaches, memory loss, loss of strength, numbness, or tremors  SKIN: No itching, burning, rashes, or lesions   LYMPH NODES: No enlarged glands  ENDOCRINE: No heat or cold intolerance; No hair loss  MUSCULOSKELETAL: +Neck pain.   PSYCHIATRIC: No depression, anxiety, mood swings, or difficulty sleeping  HEME/LYMPH: No easy bruising, or bleeding gums  ALLERY AND IMMUNOLOGIC: No hives or eczema    Allergies    No Known Allergies    Intolerances    Social History:   No smoking  No alcohol     FAMILY HISTORY:  Mother had diabetes       MEDICATIONS  (STANDING):  Lipitor 20mg PO qhs  Levothyroxine 75mcg PO daily   Omeprazole 40mg PO daily   Fosamax qweekly       Vital Signs Last 24 Hrs  T(C): 37 (13 Sep 2018 11:07), Max: 37 (13 Sep 2018 11:07)  T(F): 98.6 (13 Sep 2018 11:07), Max: 98.6 (13 Sep 2018 11:07)  HR: 75 (13 Sep 2018 11:07) (75 - 78)  BP: 145/85 (13 Sep 2018 11:07) (131/71 - 145/85)  BP(mean): --  RR: 18 (13 Sep 2018 11:07) (18 - 18)  SpO2: 99% (13 Sep 2018 11:07) (98% - 99%)  CAPILLARY BLOOD GLUCOSE        I&O's Summary      PHYSICAL EXAM:  GENERAL: NAD  HEAD:  Atraumatic, Normocephalic  EYES: EOMI, PERRLA, conjunctiva and sclera clear  NECK: No JVD, c-collar  CHEST/LUNG: Clear to auscultation bilaterally; No wheeze  HEART: Regular rate and rhythm; S1S2  ABDOMEN: Soft, Nontender, Nondistended; Bowel sounds present  EXTREMITIES:  2+ Peripheral Pulses, No clubbing, cyanosis, or edema  PSYCH: AAOx3  NEUROLOGY: CN's intact, moves all extremities   SKIN: No rashes or lesions    LABS:                        13.6   7.4   )-----------( 201      ( 13 Sep 2018 11:22 )             41.0     09-13    132<L>  |  95<L>  |  10  ----------------------------<  122<H>  3.9   |  24  |  0.45<L>    Ca    9.1      13 Sep 2018 11:22    TPro  7.4  /  Alb  4.5  /  TBili  0.5  /  DBili  x   /  AST  26  /  ALT  24  /  AlkPhos  59  09-13    PT/INR - ( 13 Sep 2018 11:22 )   PT: 10.4 sec;   INR: 0.96 ratio         PTT - ( 13 Sep 2018 11:22 )  PTT:28.0 sec          RADIOLOGY & ADDITIONAL TESTS:    Imaging Personally Reviewed:  Head CT: No acute intracranial hemorrhage, mass effect, or shift of the   midline structures.    Small to moderate-sized left frontal scalp hematoma.    Similar-appearing microvascular disease.    Cervical spine CT: Acute type II dens fracture and associated anterior   and posterior neural arch fractures of C1 with associated retropharyngeal   edema.    EKG tracing reviewed and interpreted: NSR, no acute ischemic changes     Consultant(s) Notes Reviewed:      Care Discussed with Consultants/Other Providers:

## 2018-09-14 LAB
ANION GAP SERPL CALC-SCNC: 11 MMOL/L — SIGNIFICANT CHANGE UP (ref 5–17)
APTT BLD: 26.7 SEC — LOW (ref 27.5–37.4)
BUN SERPL-MCNC: 10 MG/DL — SIGNIFICANT CHANGE UP (ref 7–23)
CALCIUM SERPL-MCNC: 8.1 MG/DL — LOW (ref 8.4–10.5)
CHLORIDE SERPL-SCNC: 103 MMOL/L — SIGNIFICANT CHANGE UP (ref 96–108)
CO2 SERPL-SCNC: 24 MMOL/L — SIGNIFICANT CHANGE UP (ref 22–31)
CREAT SERPL-MCNC: 0.44 MG/DL — LOW (ref 0.5–1.3)
GLUCOSE SERPL-MCNC: 120 MG/DL — HIGH (ref 70–99)
HCT VFR BLD CALC: 40.6 % — SIGNIFICANT CHANGE UP (ref 34.5–45)
HGB BLD-MCNC: 12.9 G/DL — SIGNIFICANT CHANGE UP (ref 11.5–15.5)
INR BLD: 0.97 RATIO — SIGNIFICANT CHANGE UP (ref 0.88–1.16)
MCHC RBC-ENTMCNC: 25.2 PG — LOW (ref 27–34)
MCHC RBC-ENTMCNC: 31.7 GM/DL — LOW (ref 32–36)
MCV RBC AUTO: 79.4 FL — LOW (ref 80–100)
PLATELET # BLD AUTO: 207 K/UL — SIGNIFICANT CHANGE UP (ref 150–400)
POTASSIUM SERPL-MCNC: 4 MMOL/L — SIGNIFICANT CHANGE UP (ref 3.5–5.3)
POTASSIUM SERPL-SCNC: 4 MMOL/L — SIGNIFICANT CHANGE UP (ref 3.5–5.3)
PROTHROM AB SERPL-ACNC: 10.6 SEC — SIGNIFICANT CHANGE UP (ref 9.8–12.7)
RBC # BLD: 5.11 M/UL — SIGNIFICANT CHANGE UP (ref 3.8–5.2)
RBC # FLD: 13.7 % — SIGNIFICANT CHANGE UP (ref 10.3–14.5)
SODIUM SERPL-SCNC: 138 MMOL/L — SIGNIFICANT CHANGE UP (ref 135–145)
WBC # BLD: 6.1 K/UL — SIGNIFICANT CHANGE UP (ref 3.8–10.5)
WBC # FLD AUTO: 6.1 K/UL — SIGNIFICANT CHANGE UP (ref 3.8–10.5)

## 2018-09-14 PROCEDURE — 99232 SBSQ HOSP IP/OBS MODERATE 35: CPT

## 2018-09-14 PROCEDURE — 72141 MRI NECK SPINE W/O DYE: CPT | Mod: 26

## 2018-09-14 RX ADMIN — ENOXAPARIN SODIUM 40 MILLIGRAM(S): 100 INJECTION SUBCUTANEOUS at 17:26

## 2018-09-14 RX ADMIN — ONDANSETRON 4 MILLIGRAM(S): 8 TABLET, FILM COATED ORAL at 13:56

## 2018-09-14 RX ADMIN — Medication 50 MICROGRAM(S): at 05:42

## 2018-09-14 RX ADMIN — ATORVASTATIN CALCIUM 10 MILLIGRAM(S): 80 TABLET, FILM COATED ORAL at 22:43

## 2018-09-14 RX ADMIN — OXYCODONE HYDROCHLORIDE 5 MILLIGRAM(S): 5 TABLET ORAL at 08:30

## 2018-09-14 RX ADMIN — DEXTROSE MONOHYDRATE, SODIUM CHLORIDE, AND POTASSIUM CHLORIDE 75 MILLILITER(S): 50; .745; 4.5 INJECTION, SOLUTION INTRAVENOUS at 06:11

## 2018-09-14 RX ADMIN — OXYCODONE HYDROCHLORIDE 5 MILLIGRAM(S): 5 TABLET ORAL at 20:50

## 2018-09-14 RX ADMIN — OXYCODONE HYDROCHLORIDE 5 MILLIGRAM(S): 5 TABLET ORAL at 07:56

## 2018-09-14 RX ADMIN — OXYCODONE HYDROCHLORIDE 5 MILLIGRAM(S): 5 TABLET ORAL at 21:20

## 2018-09-14 RX ADMIN — Medication 100 MILLIGRAM(S): at 22:44

## 2018-09-14 NOTE — PROGRESS NOTE ADULT - PROBLEM SELECTOR PLAN 1
awaiting   for  MRI   c-spine  ,  to  discuss  with   surgery  for  possible OR tomorrow . pt  is  medically  stable  for  OR  ,low  cardiac  risk. continue on hard  collar   and  pain  meds .

## 2018-09-14 NOTE — CHART NOTE - NSCHARTNOTEFT_GEN_A_CORE
Per neurosurgery request patient seen for evaluation of cervical orthosis. Front replaced with x-small modified to improve fit. To notify office with any other issues questions or concerns.  Estrada ZIMMERMAN.  [facility]  [phone]

## 2018-09-14 NOTE — PROGRESS NOTE ADULT - ATTENDING COMMENTS
I saw and evaluated the patient. I reviewed the resident's note and agree. The patient is a 78-year-old female s/p fall down 6 steps on Tuesday complaining of persistent neck pain, found to have a Landel Type II C1 Angelito burst fracture and a mildly posteriorly displaced Type II odontoid fracture. The patient denies any upper or lower extremity pain, numbness, tingling, or weakness. The patient denies any difficulty walking or bowel and bladder issues. An MRI of the cervical spine without contrast is pending. Continue a rigid cervical collar at all times for now.

## 2018-09-15 ENCOUNTER — TRANSCRIPTION ENCOUNTER (OUTPATIENT)
Age: 78
End: 2018-09-15

## 2018-09-15 VITALS
DIASTOLIC BLOOD PRESSURE: 70 MMHG | OXYGEN SATURATION: 98 % | HEART RATE: 66 BPM | SYSTOLIC BLOOD PRESSURE: 120 MMHG | RESPIRATION RATE: 16 BRPM

## 2018-09-15 PROCEDURE — 80053 COMPREHEN METABOLIC PANEL: CPT

## 2018-09-15 PROCEDURE — 85610 PROTHROMBIN TIME: CPT

## 2018-09-15 PROCEDURE — 85014 HEMATOCRIT: CPT

## 2018-09-15 PROCEDURE — 82435 ASSAY OF BLOOD CHLORIDE: CPT

## 2018-09-15 PROCEDURE — 85730 THROMBOPLASTIN TIME PARTIAL: CPT

## 2018-09-15 PROCEDURE — 85027 COMPLETE CBC AUTOMATED: CPT

## 2018-09-15 PROCEDURE — 70450 CT HEAD/BRAIN W/O DYE: CPT

## 2018-09-15 PROCEDURE — 97163 PT EVAL HIGH COMPLEX 45 MIN: CPT

## 2018-09-15 PROCEDURE — 84132 ASSAY OF SERUM POTASSIUM: CPT

## 2018-09-15 PROCEDURE — 86850 RBC ANTIBODY SCREEN: CPT

## 2018-09-15 PROCEDURE — 86901 BLOOD TYPING SEROLOGIC RH(D): CPT

## 2018-09-15 PROCEDURE — 83605 ASSAY OF LACTIC ACID: CPT

## 2018-09-15 PROCEDURE — 82947 ASSAY GLUCOSE BLOOD QUANT: CPT

## 2018-09-15 PROCEDURE — 36415 COLL VENOUS BLD VENIPUNCTURE: CPT

## 2018-09-15 PROCEDURE — 80048 BASIC METABOLIC PNL TOTAL CA: CPT

## 2018-09-15 PROCEDURE — 99285 EMERGENCY DEPT VISIT HI MDM: CPT

## 2018-09-15 PROCEDURE — 72141 MRI NECK SPINE W/O DYE: CPT

## 2018-09-15 PROCEDURE — 82803 BLOOD GASES ANY COMBINATION: CPT

## 2018-09-15 PROCEDURE — 82330 ASSAY OF CALCIUM: CPT

## 2018-09-15 PROCEDURE — 86900 BLOOD TYPING SEROLOGIC ABO: CPT

## 2018-09-15 PROCEDURE — 93005 ELECTROCARDIOGRAM TRACING: CPT

## 2018-09-15 PROCEDURE — 72125 CT NECK SPINE W/O DYE: CPT

## 2018-09-15 PROCEDURE — 81001 URINALYSIS AUTO W/SCOPE: CPT

## 2018-09-15 PROCEDURE — 99239 HOSP IP/OBS DSCHRG MGMT >30: CPT

## 2018-09-15 PROCEDURE — 84295 ASSAY OF SERUM SODIUM: CPT

## 2018-09-15 RX ORDER — ACETAMINOPHEN 500 MG
2 TABLET ORAL
Qty: 0 | Refills: 0 | DISCHARGE
Start: 2018-09-15

## 2018-09-15 RX ORDER — OXYCODONE HYDROCHLORIDE 5 MG/1
1 TABLET ORAL
Qty: 15 | Refills: 0
Start: 2018-09-15

## 2018-09-15 RX ORDER — SODIUM CHLORIDE 0.65 %
1 AEROSOL, SPRAY (ML) NASAL EVERY 4 HOURS
Qty: 0 | Refills: 0 | Status: DISCONTINUED | OUTPATIENT
Start: 2018-09-15 | End: 2018-09-15

## 2018-09-15 RX ADMIN — OXYCODONE HYDROCHLORIDE 5 MILLIGRAM(S): 5 TABLET ORAL at 06:52

## 2018-09-15 RX ADMIN — OXYCODONE HYDROCHLORIDE 5 MILLIGRAM(S): 5 TABLET ORAL at 06:22

## 2018-09-15 RX ADMIN — Medication 100 MILLIGRAM(S): at 06:23

## 2018-09-15 RX ADMIN — Medication 1 SPRAY(S): at 13:32

## 2018-09-15 RX ADMIN — Medication 50 MICROGRAM(S): at 06:23

## 2018-09-15 RX ADMIN — Medication 100 MILLIGRAM(S): at 13:32

## 2018-09-15 NOTE — PROGRESS NOTE ADULT - ASSESSMENT
78 F, PMH of HLD, hypothyroid,  coming in with a mechanical fall down 6 stairs on tuesday. Comes in today because pain has not gotten better. Denies radiculopathy, numbness, paresthesias, weakness. CT showed significant type 2 dens fx with c1 arch fx.       PLAN:   -MRI cervical spine - C2 type 2 dens fracture, no cord compression, or hemorrhage  -Continue cervical collar at all times.   -Oxycodone PRN for pain control  -Continue lipitor for hx of HLD  -Continue synthroid for hx of hypothyroidism  -Encouraged mobilization  -Encouraged incentive spirometer  -DVT prophylaxis: SQL, bilateral venodynes  -Dispo: no skilled PT needs  - I spoke with pts's friend that she lives with, Mya Tenorio [phone]. She assured me that she will provide supervision to pt and reinforce the need for her to wear the collar at all times. follow up with Dr Coronado in 6 weeks      [facility] #[id number]
HPI: Niya Deleon, 78 F, PMH of HLP, hypothyroid,  coming in with a mechanical fall down 6 stairs on tuesday. Comes in today because pain has not gotten better. Denies radiculopathy, numbness, paresthesias, weakness. CT showed significant type 2 dens fx with c1 arch fx. (13 Sep 2018 11:11)      PLAN:   -MRI cervical spine today  -Continue cervical collar at all times. Current [place] J collar is ill-fitting; orthotist Estrada Donald called today for extra short collar   -Oxycodone PRN for pain control  -Continue lipitor for hx of HLD  -Continue synthroid for hx of hypothyroidism  -Encouraged mobilization  -Encouraged incentive spirometer  -DVT prophylaxis: SQL, bilateral venodynes  -Dispo: PT pending  -Will discuss with Dr. Cliff Edmondson #[id number]

## 2018-09-15 NOTE — PHYSICAL THERAPY INITIAL EVALUATION ADULT - PERTINENT HX OF CURRENT PROBLEM, REHAB EVAL
Pt is a 77yo Telugu-speaking F admitted s/p fall with resultant neck pain. Imaging revealed type II dens fx & C1 arch fx.

## 2018-09-15 NOTE — DISCHARGE NOTE ADULT - CARE PLAN
Principal Discharge DX:	Closed stable burst fracture of first cervical vertebra, initial encounter  Goal:	stable  Assessment and plan of treatment:	You must wear the collar at all times, including sleeping, and showering. You have an extra collar to wear in the shower. No strenuous activity, No bending lifting or twisting.  Make appointment for follow up with Neurosurgeon Dr Coronado  phone [phone] in 6 weeks.  Secondary Diagnosis:	Hypothyroidism, unspecified type  Goal:	stable  Assessment and plan of treatment:	Continue levothyroxine. Make appointment for folow up with your Primary Care physician in 1 week.  Secondary Diagnosis:	Hyperlipidemia, unspecified hyperlipidemia type  Goal:	stable  Assessment and plan of treatment:	Continue statin

## 2018-09-15 NOTE — DISCHARGE NOTE ADULT - MEDICATION SUMMARY - MEDICATIONS TO STOP TAKING
I will STOP taking the medications listed below when I get home from the hospital:    Flagyl 500 mg oral tablet  -- 1 tab(s) by mouth every 8 hours x 5 days    Cipro 500 mg oral tablet  -- 1 tab(s) by mouth every 12 hours x 5 days

## 2018-09-15 NOTE — PROGRESS NOTE ADULT - SUBJECTIVE AND OBJECTIVE BOX
SUBJECTIVE: Pt seen and examined, resting confortably in bed this morning, reinforced to pt that she must wear collar at all times    OVERNIGHT EVENTS: none    Vital Signs Last 24 Hrs  T(C): 36.9 (15 Sep 2018 10:47), Max: 37.1 (15 Sep 2018 00:12)  T(F): 98.4 (15 Sep 2018 10:47), Max: 98.8 (15 Sep 2018 04:50)  HR: 66 (15 Sep 2018 11:23) (65 - 74)  BP: 120/70 (15 Sep 2018 11:23) (119/72 - 149/82)  BP(mean): --  RR: 16 (15 Sep 2018 11:23) (16 - 18)  SpO2: 98% (15 Sep 2018 11:23) (97% - 99%)    PHYSICAL EXAM:    General: No Acute Distress     Neurological: Awake, alert oriented to person, place and time, Following Commands,  Speech Fluent, Moving all extremities, Muscle Strength normal in all four extremities, No Drift, Sensation to Light Touch Intact    Pulmonary: Clear to Auscultation, No Rales, No Rhonchi, No Wheezes     Cardiovascular: S1, S2, Regular Rate and Rhythm     Gastrointestinal: Soft, Nontender, Nondistended     Incision: none    LABS:                        12.9   6.1   )-----------( 207      ( 14 Sep 2018 08:12 )             40.6    09-14    138  |  103  |  10  ----------------------------<  120<H>  4.0   |  24  |  0.44<L>    Ca    8.1<L>      14 Sep 2018 08:12    PT/INR - ( 14 Sep 2018 08:12 )   PT: 10.6 sec;   INR: 0.97 ratio         PTT - ( 14 Sep 2018 08:12 )  PTT:26.7 sec      09-14 @ 07:01  -  09-15 @ 07:00  --------------------------------------------------------  IN: 1160 mL / OUT: 1600 mL / NET: -440 mL    09-15 @ 07:01  -  09-15 @ 14:21  --------------------------------------------------------  IN: 800 mL / OUT: 0 mL / NET: 800 mL      DRAINS: none    MEDICATIONS:  Antibiotics:    Neuro:  acetaminophen   Tablet .. 650 milliGRAM(s) Oral every 6 hours PRN Temp greater or equal to 38C (100.4F), Mild Pain (1 - 3)  ondansetron   Disintegrating Tablet 4 milliGRAM(s) Oral every 6 hours PRN Nausea  oxyCODONE    IR 5 milliGRAM(s) Oral every 6 hours PRN Moderate Pain (4 - 6)  oxyCODONE    IR 10 milliGRAM(s) Oral every 6 hours PRN Severe Pain (7 - 10)    Cardiac:    Pulm:    GI/:  docusate sodium 100 milliGRAM(s) Oral three times a day  senna 2 Tablet(s) Oral at bedtime PRN Constipation    Other:   atorvastatin 10 milliGRAM(s) Oral at bedtime  enoxaparin Injectable 40 milliGRAM(s) SubCutaneous every 24 hours  influenza   Vaccine 0.5 milliLiter(s) IntraMuscular once  levothyroxine 50 MICROGram(s) Oral daily  sodium chloride 0.65% Nasal 1 Spray(s) Both Nostrils every 4 hours PRN Nasal Congestion    DIET: [x] Regular [] CCD [] Renal [] Puree [] Dysphagia [] Tube Feeds:     IMAGING:   < from: MR Cervical Spine No Cont (09.14.18 @ 17:19) >    Comparison is made with the prior CT of 9/13/2018.    The nondisplaced type II dens fracture is identified with a small amount   of fluid at the fracture site. There is mild prevertebral edema. The C1   anterior ring fracture is not as appreciated on this examination as on   the CT. There is no significant hemorrhage. There is no subluxation.   There is no displacement. The cervical lordosis is maintained. Small disc   osteophyte complexes are present at C3-4 C5-6. The cervical cord is   normal in size, contour, and signal characteristics.    Impression: Type II dens fracture and fracture of the anterior arch of C1   without displacement. No significant change since 9/13/2018. No cord   compression or intraspinal hemorrhage.        < end of copied text >
Chief complaint: minimal pain   no  neurological  deficits , MRI  c-spine   type  ll   dens   fracture   with  no  cord  compression  , no  pain  on  hard  cervical   collar  .    HPI:  p ([id number])     HPI: Niya Deleon, 78 F, PMH of HLP, hypothyroid,  coming in with a mechanical fall down 6 stairs on tuesday. Comes in today because pain has not gotten better. Denies radiculopathy, numbness, paresthesias, weakness. CT showed significant type 2 dens fx with c1 arch fx. (13 Sep 2018 11:11)      REVIEW OF SYSTEMS:    CONSTITUTIONAL: No fever, weight loss, or fatigue  NECK: No pain or stiffness  RESPIRATORY: No cough, wheezing, chills or hemoptysis; No shortness of breath  CARDIOVASCULAR: No chest pain, palpitations, dizziness, or leg swelling  GASTROINTESTINAL: No abdominal or epigastric pain. No nausea, vomiting, or hematemesis; No diarrhea or constipation. No melena or hematochezia.  GENITOURINARY: No dysuria, frequency, hematuria, or incontinence  NEUROLOGICAL: No headaches, memory loss, loss of strength, numbness, or tremors  SKIN: No itching, burning, rashes, or lesions   LYMPH NODES: No enlarged glands  MUSCULOSKELETAL: No joint pain or swelling; No muscle, back, or extremity pain  HEME/LYMPH: No easy bruising, or bleeding gums    MEDICATIONS  (STANDING):  atorvastatin 10 milliGRAM(s) Oral at bedtime  docusate sodium 100 milliGRAM(s) Oral three times a day  enoxaparin Injectable 40 milliGRAM(s) SubCutaneous every 24 hours  influenza   Vaccine 0.5 milliLiter(s) IntraMuscular once  levothyroxine 50 MICROGram(s) Oral daily    MEDICATIONS  (PRN):  acetaminophen   Tablet .. 650 milliGRAM(s) Oral every 6 hours PRN Temp greater or equal to 38C (100.4F), Mild Pain (1 - 3)  ondansetron   Disintegrating Tablet 4 milliGRAM(s) Oral every 6 hours PRN Nausea  oxyCODONE    IR 5 milliGRAM(s) Oral every 6 hours PRN Moderate Pain (4 - 6)  oxyCODONE    IR 10 milliGRAM(s) Oral every 6 hours PRN Severe Pain (7 - 10)  senna 2 Tablet(s) Oral at bedtime PRN Constipation  sodium chloride 0.65% Nasal 1 Spray(s) Both Nostrils every 4 hours PRN Nasal Congestion      Allergies    No Known Allergies    Intolerances        Vital Signs Last 24 Hrs  T(C): 37.1 (15 Sep 2018 04:50), Max: 37.1 (15 Sep 2018 00:12)  T(F): 98.8 (15 Sep 2018 04:50), Max: 98.8 (15 Sep 2018 04:50)  HR: 69 (15 Sep 2018 04:50) (66 - 74)  BP: 149/82 (15 Sep 2018 04:50) (127/72 - 149/82)  BP(mean): --  RR: 16 (15 Sep 2018 04:50) (16 - 16)  SpO2: 97% (15 Sep 2018 04:50) (97% - 99%)    PHYSICAL EXAM:    GENERAL: NAD, well-groomed, well-developed  HEAD:  Atraumatic, Normocephalic  EYES: EOMI, PERRLA, conjunctiva and sclera clear  ENMT: No tonsillar erythema, exudates, or enlargement; Moist mucous membranes, Good dentition, No lesions  NECK: Supple, No JVD, Normal thyroid  NERVOUS SYSTEM:  Alert & Oriented X3, Good concentration; Motor Strength 5/5 B/L upper and lower extremities; DTRs 2+ intact and symmetric  CHEST/LUNG: Clear to percussion bilaterally; No rales, rhonchi, wheezing, or rubs  HEART: Regular rate and rhythm; No murmurs, rubs, or gallops  ABDOMEN: Soft, Nontender, Nondistended; Bowel sounds present  EXTREMITIES:  2+ Peripheral Pulses, No clubbing, cyanosis, or edema  LYMPH: No lymphadenopathy noted  SKIN: No rashes or lesions      LABS:                        12.9   6.1   )-----------( 207      ( 14 Sep 2018 08:12 )             40.6     09-14    138  |  103  |  10  ----------------------------<  120<H>  4.0   |  24  |  0.44<L>    Ca    8.1<L>      14 Sep 2018 08:12    TPro  7.4  /  Alb  4.5  /  TBili  0.5  /  DBili  x   /  AST  26  /  ALT  24  /  AlkPhos  59  09-13    PT/INR - ( 14 Sep 2018 08:12 )   PT: 10.6 sec;   INR: 0.97 ratio         PTT - ( 14 Sep 2018 08:12 )  PTT:26.7 sec  Urinalysis Basic - ( 13 Sep 2018 21:04 )    Color: Light Yellow / Appearance: Slightly Turbid / S.010 / pH: x  Gluc: x / Ketone: Negative  / Bili: Negative / Urobili: Negative   Blood: x / Protein: Negative / Nitrite: Negative   Leuk Esterase: Large / RBC: 3 /hpf / WBC 53 /hpf   Sq Epi: x / Non Sq Epi: 1 /hpf / Bacteria: Few        RADIOLOGY & ADDITIONAL STUDIES:
Chief complaint: pt  c/o pain on back of the   neck ,  no extremity  weakness ,  awaiting  for  MRI  c -spine  pending   OR  time  ,  pt  NPO      HPI:  p ([id number])     HPI: Niya Deleon, 78 F, PMH of HLP, hypothyroid,  coming in with a mechanical fall down 6 stairs on tuesday. Comes in today because pain has not gotten better. Denies radiculopathy, numbness, paresthesias, weakness. CT showed significant type 2 dens fx with c1 arch fx. (13 Sep 2018 11:11)      REVIEW OF SYSTEMS:    CONSTITUTIONAL: No fever, weight loss, or fatigue  NECK: No pain or stiffness  RESPIRATORY: No cough, wheezing, chills or hemoptysis; No shortness of breath  CARDIOVASCULAR: No chest pain, palpitations, dizziness, or leg swelling  GASTROINTESTINAL: No abdominal or epigastric pain. No nausea, vomiting, or hematemesis; No diarrhea or constipation. No melena or hematochezia.  GENITOURINARY: No dysuria, frequency, hematuria, or incontinence  NEUROLOGICAL: No headaches, memory loss, loss of strength, numbness, or tremors  SKIN: No itching, burning, rashes, or lesions   LYMPH NODES: No enlarged glands  MUSCULOSKELETAL: No joint pain or swelling; No muscle, back, or extremity pain  HEME/LYMPH: No easy bruising, or bleeding gums    MEDICATIONS  (STANDING):  atorvastatin 10 milliGRAM(s) Oral at bedtime  docusate sodium 100 milliGRAM(s) Oral three times a day  enoxaparin Injectable 40 milliGRAM(s) SubCutaneous every 24 hours  influenza   Vaccine 0.5 milliLiter(s) IntraMuscular once  levothyroxine 50 MICROGram(s) Oral daily  sodium chloride 0.9% with potassium chloride 20 mEq/L 1000 milliLiter(s) (75 mL/Hr) IV Continuous <Continuous>    MEDICATIONS  (PRN):  acetaminophen   Tablet .. 650 milliGRAM(s) Oral every 6 hours PRN Temp greater or equal to 38C (100.4F), Mild Pain (1 - 3)  ondansetron   Disintegrating Tablet 4 milliGRAM(s) Oral every 6 hours PRN Nausea  oxyCODONE    IR 5 milliGRAM(s) Oral every 6 hours PRN Moderate Pain (4 - 6)  oxyCODONE    IR 10 milliGRAM(s) Oral every 6 hours PRN Severe Pain (7 - 10)  senna 2 Tablet(s) Oral at bedtime PRN Constipation      Allergies    No Known Allergies    Intolerances        Vital Signs Last 24 Hrs  T(C): 36.8 (14 Sep 2018 05:15), Max: 37.2 (13 Sep 2018 14:06)  T(F): 98.3 (14 Sep 2018 05:15), Max: 98.9 (13 Sep 2018 14:06)  HR: 68 (14 Sep 2018 05:15) (66 - 101)  BP: 142/67 (14 Sep 2018 05:15) (121/72 - 167/83)  BP(mean): --  RR: 18 (14 Sep 2018 05:15) (18 - 18)  SpO2: 98% (14 Sep 2018 05:15) (97% - 99%)    PHYSICAL EXAM:    GENERAL: NAD, well-groomed, well-developed  HEAD:  Atraumatic, Normocephalic  EYES: EOMI, PERRLA, conjunctiva and sclera clear  ENMT: No tonsillar erythema, exudates, or enlargement; Moist mucous membranes, Good dentition, No lesions  NECK: Supple, No JVD, Normal thyroid  NERVOUS SYSTEM:  Alert & Oriented X3, Good concentration; Motor Strength 5/5 B/L upper and lower extremities; DTRs 2+ intact and symmetric  CHEST/LUNG: Clear to percussion bilaterally; No rales, rhonchi, wheezing, or rubs  HEART: Regular rate and rhythm; No murmurs, rubs, or gallops  ABDOMEN: Soft, Nontender, Nondistended; Bowel sounds present  EXTREMITIES:  2+ Peripheral Pulses, No clubbing, cyanosis, or edema  LYMPH: No lymphadenopathy noted  SKIN: No rashes or lesions      LABS:                        12.9   6.1   )-----------( 207      ( 14 Sep 2018 08:12 )             40.6     09-13    132<L>  |  95<L>  |  10  ----------------------------<  122<H>  3.9   |  24  |  0.45<L>    Ca    9.1      13 Sep 2018 11:22    TPro  7.4  /  Alb  4.5  /  TBili  0.5  /  DBili  x   /  AST  26  /  ALT  24  /  AlkPhos  59  09-13    PT/INR - ( 13 Sep 2018 11:22 )   PT: 10.4 sec;   INR: 0.96 ratio         PTT - ( 13 Sep 2018 11:22 )  PTT:28.0 sec  Urinalysis Basic - ( 13 Sep 2018 21:04 )    Color: Light Yellow / Appearance: Slightly Turbid / S.010 / pH: x  Gluc: x / Ketone: Negative  / Bili: Negative / Urobili: Negative   Blood: x / Protein: Negative / Nitrite: Negative   Leuk Esterase: Large / RBC: 3 /hpf / WBC 53 /hpf   Sq Epi: x / Non Sq Epi: 1 /hpf / Bacteria: Few        RADIOLOGY & ADDITIONAL STUDIES:
SUBJECTIVE: Patient seen and examined at bedside. She is complaining of discomfort from the cervical collar but otherwise no complaints. Denies chest pain, shortness of breath, nausea/vomiting.     Vital Signs Last 24 Hrs  T(C): 36.8 (09-14-18 @ 09:16), Max: 37.2 (09-13-18 @ 14:06)  T(F): 98.2 (09-14-18 @ 09:16), Max: 98.9 (09-13-18 @ 14:06)  HR: 66 (09-14-18 @ 09:16) (66 - 101)  BP: 150/81 (09-14-18 @ 09:16) (121/72 - 167/83)  RR: 16 (09-14-18 @ 09:16) (16 - 18)  SpO2: 98% (09-14-18 @ 09:16) (97% - 99%)    PHYSICAL EXAM:    Constitutional: No Acute Distress     Neurological: Awake, alert, oriented to person, place and time, speech clear and fluent, face equal, tongue midline, briskly following commands, no drift, moves all extremities with 5/5 strength, sensation intact to light touch throughout, pupils 4mm and reactive bilaterally, extraocular movements intact, no nystagmus    +[place] J collar in place    Pulmonary: Clear to Auscultation, No rales, No rhonchi, No wheezes     Cardiovascular: S1, S2, Regular rate and rhythm     Gastrointestinal: Soft, Non-tender, Non-distended, +bowel sounds x 4    Extremities: No calf tenderness bilaterally, no cyanosis, clubbing or edema          LABS:                          12.9   6.1   )-----------( 207      ( 14 Sep 2018 08:12 )             40.6    09-14    138  |  103  |  10  ----------------------------<  120<H>  4.0   |  24  |  0.44<L>    Ca    8.1<L>      14 Sep 2018 08:12    TPro  7.4  /  Alb  4.5  /  TBili  0.5  /  DBili  x   /  AST  26  /  ALT  24  /  AlkPhos  59  09-13  PT/INR - ( 14 Sep 2018 08:12 )   PT: 10.6 sec;   INR: 0.97 ratio         PTT - ( 14 Sep 2018 08:12 )  PTT:26.7 sec    09-13 @ 07:01  -  09-14 @ 07:00  --------------------------------------------------------  IN: 0 mL / OUT: 1400 mL / NET: -1400 mL    09-14 @ 07:01  -  09-14 @ 11:45  --------------------------------------------------------  IN: 240 mL / OUT: 0 mL / NET: 240 mL        IMAGING:     < from: CT Cervical Spine No Cont (09.13.18 @ 09:57) >  IMPRESSION:     Head CT: No acute intracranial hemorrhage, mass effect, or shift of the   midline structures.    Small to moderate-sized left frontal scalp hematoma.    Similar-appearing microvascular disease.    Cervical spine CT: Acute type II dens fracture and associated anterior   and posterior neural arch fractures of C1 with associated retropharyngeal   edema.    < end of copied text >      MEDICATIONS:  Antibiotics:    Neuro:  acetaminophen   Tablet .. 650 milliGRAM(s) Oral every 6 hours PRN Temp greater or equal to 38C (100.4F), Mild Pain (1 - 3)  ondansetron   Disintegrating Tablet 4 milliGRAM(s) Oral every 6 hours PRN Nausea  oxyCODONE    IR 5 milliGRAM(s) Oral every 6 hours PRN Moderate Pain (4 - 6)  oxyCODONE    IR 10 milliGRAM(s) Oral every 6 hours PRN Severe Pain (7 - 10)    Cardiac:    Pulm:    GI/:  docusate sodium 100 milliGRAM(s) Oral three times a day  senna 2 Tablet(s) Oral at bedtime PRN Constipation    Other:   atorvastatin 10 milliGRAM(s) Oral at bedtime  enoxaparin Injectable 40 milliGRAM(s) SubCutaneous every 24 hours  influenza   Vaccine 0.5 milliLiter(s) IntraMuscular once  levothyroxine 50 MICROGram(s) Oral daily  sodium chloride 0.9% with potassium chloride 20 mEq/L 1000 milliLiter(s) IV Continuous <Continuous>        DIET: regular

## 2018-09-15 NOTE — DISCHARGE NOTE ADULT - ADDITIONAL INSTRUCTIONS
Any sign of increased pain in neck, or weakness in arms, lethargy or change in mental status contact Dr Coronado and return to ER.

## 2018-09-15 NOTE — DISCHARGE NOTE ADULT - HOSPITAL COURSE
77 y/o  F, PMH of HLD, hypothyroid,  coming in with a mechanical fall down 6 stairs on tuesday. Comes in today because pain has not gotten better. Denies radiculopathy, numbness, paresthesias, weakness. CT showed significant type 2 dens fx with c1 arch fx.   Pt admitted to [facility]. MRI C spine confirms C2 type 2 dens fracture, no cord compression, no hemorrhage. Pt is to wear cervical collar at all times, oxycodone for pain control.     Per pts's friend that she lives with, Mya Coby [phone]. She assured me that she will provide supervision to pt and reinforce the need for her to wear the collar at all times. Pt was evaluated by Physical Therapy and no skilled PT needs were recommended. Pt is medicaly and neurologically stable for discharge to home.

## 2018-09-15 NOTE — DISCHARGE NOTE ADULT - PATIENT PORTAL LINK FT
You can access the [email or web address] Patient Portal, offered by [facility], by registering with the following website: [email or web address]

## 2018-09-15 NOTE — DISCHARGE NOTE ADULT - PLAN OF CARE
stable You must wear the collar at all times, including sleeping, and showering. You have an extra collar to wear in the shower. No strenuous activity, No bending lifting or twisting.  Make appointment for follow up with Neurosurgeon Dr Coronado  phone [phone] in 6 weeks. Continue levothyroxine. Make appointment for folow up with your Primary Care physician in 1 week. Continue statin

## 2018-09-15 NOTE — DISCHARGE NOTE ADULT - MEDICATION SUMMARY - MEDICATIONS TO TAKE
I will START or STAY ON the medications listed below when I get home from the hospital:    acetaminophen 325 mg oral tablet  -- 2 tab(s) by mouth every 6 hours, As needed, Temp greater or equal to 38C (100.4F), Mild Pain (1 - 3)  -- Indication: For mild pain    oxyCODONE 5 mg oral tablet  -- 1 tab(s) by mouth every 6 hours, As needed, Moderate Pain (4 - 6) MDD:3 tabs  -- Indication: For moderate pain    lovastatin 40 mg oral tablet  -- 1 tab(s) by mouth once a day  -- Indication: For Cholesterol    levothyroxine 50 mcg (0.05 mg) oral tablet  -- 1 tab(s) by mouth once a day  -- Indication: For thyroid    Calcium 600+D 600 mg-200 units oral tablet  -- 1 tab(s) by mouth 2 times a day  -- Indication: For vitamin

## 2018-09-15 NOTE — PROGRESS NOTE ADULT - PROBLEM SELECTOR PLAN 1
minimal pain   no  neurological  deficits , MRI  c-spine   type  ll   dens   fracture   with  no  cord  compression  , no  pain  on  hard  cervical   collar  . will  start on    PT  hold  OR

## 2018-09-15 NOTE — DISCHARGE NOTE ADULT - CARE PROVIDER_API CALL
Mason Coronado (DO), Neurological Surgery  [address] [place] [address]  [address]  [place] [address]  Phone: [phone]  Fax: [phone]

## 2018-09-15 NOTE — PHYSICAL THERAPY INITIAL EVALUATION ADULT - ADDITIONAL COMMENTS
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 Pt states they knew she had no where else to go so they let her stay with them & she's been living with them. Pt states she stays in a private home, no entry steps. Pt has no need to negotiate any stairs inside home (+ flight with rail to 2nd level and basement) --> her bedroom & bathroom is on the 1st floor. Prior to admission pt was independent with all functional mobility including community ambulation without a device. Pt is right hand dominant & wears eye glasses for reading. Goal of therapy: return home.

## 2018-09-18 ENCOUNTER — APPOINTMENT (OUTPATIENT)
Dept: SPINE | Facility: HOSPITAL | Age: 78
End: 2018-09-18

## 2018-11-01 ENCOUNTER — APPOINTMENT (OUTPATIENT)
Dept: SPINE | Facility: CLINIC | Age: 78
End: 2018-11-01
Payer: MEDICARE

## 2018-11-01 VITALS
BODY MASS INDEX: 22.18 KG/M2 | SYSTOLIC BLOOD PRESSURE: 128 MMHG | HEART RATE: 75 BPM | HEIGHT: 59 IN | DIASTOLIC BLOOD PRESSURE: 75 MMHG | WEIGHT: 110 LBS

## 2018-11-01 DIAGNOSIS — S12.030S: ICD-10-CM

## 2018-11-01 PROCEDURE — 99214 OFFICE O/P EST MOD 30 MIN: CPT

## 2018-11-06 ENCOUNTER — OUTPATIENT (OUTPATIENT)
Dept: OUTPATIENT SERVICES | Facility: HOSPITAL | Age: 78
LOS: 1 days | End: 2018-11-06
Payer: MEDICARE

## 2018-11-06 ENCOUNTER — APPOINTMENT (OUTPATIENT)
Dept: CT IMAGING | Facility: CLINIC | Age: 78
End: 2018-11-06
Payer: MEDICARE

## 2018-11-06 DIAGNOSIS — Z90.710 ACQUIRED ABSENCE OF BOTH CERVIX AND UTERUS: Chronic | ICD-10-CM

## 2018-11-06 DIAGNOSIS — Z00.8 ENCOUNTER FOR OTHER GENERAL EXAMINATION: ICD-10-CM

## 2018-11-06 PROCEDURE — 76376 3D RENDER W/INTRP POSTPROCES: CPT | Mod: 26

## 2018-11-06 PROCEDURE — 72125 CT NECK SPINE W/O DYE: CPT

## 2018-11-06 PROCEDURE — 72125 CT NECK SPINE W/O DYE: CPT | Mod: 26

## 2018-11-06 PROCEDURE — 76376 3D RENDER W/INTRP POSTPROCES: CPT

## 2018-11-15 ENCOUNTER — APPOINTMENT (OUTPATIENT)
Dept: SPINE | Facility: CLINIC | Age: 78
End: 2018-11-15
Payer: MEDICARE

## 2018-11-15 VITALS
HEART RATE: 65 BPM | DIASTOLIC BLOOD PRESSURE: 65 MMHG | BODY MASS INDEX: 21.6 KG/M2 | SYSTOLIC BLOOD PRESSURE: 122 MMHG | WEIGHT: 110 LBS | HEIGHT: 60 IN

## 2018-11-15 PROCEDURE — 99214 OFFICE O/P EST MOD 30 MIN: CPT

## 2018-11-20 NOTE — CONSULT NOTE ADULT - PROBLEM SELECTOR RECOMMENDATION 3
Caused by amount of drops used OD. May need surgery in future to correct. Will not correct on it's own. Continue statin

## 2019-08-19 NOTE — PROGRESS NOTE ADULT - REASON FOR ADMISSION
8/19/2019         RE: Daria Keene  [address] [place] [address]        Dear Colleague,    [facility]ene[facility] Please see a copy of my visit note below.    Chief Complaint   Patient presents with     Annual Eye Exam      Accompanied by mother and Accompanied by girlfriend  Last Eye Exam: 1st eye exam  Dilated Previously: No, side effects of dilation explained today    What are you currently using to see?  does not use glasses or contacts       Distance Vision Acuity: Noticed gradual change in both eyes    Near Vision Acuity: Satisfied with vision while reading  unaided    Eye Comfort: good  Do you use eye drops? : No  Occupation or Hobbies: 10th grade    Caity Dodd Optometric Assistant, A.[employer]          Medical, surgical and family histories reviewed and updated 8/19/2019.       OBJECTIVE: See Ophthalmology exam    ASSESSMENT:    ICD-10-CM    1. Myopia of both eyes H52.13       PLAN:   Distance only prescription     Charu Dorantes OD     Again, thank you for allowing me to participate in the care of your patient.        Sincerely,        Charu Dorantes, OD    
neck pain
Admitted 9/13 neck pain after fall; found with C2 type 2 dens fracture and C1 anterior arch fractrue
neck pain
neck pain

## 2021-06-23 ENCOUNTER — EMERGENCY (EMERGENCY)
Facility: HOSPITAL | Age: 81
LOS: 1 days | Discharge: HOME | End: 2021-06-23
Attending: EMERGENCY MEDICINE
Payer: MEDICARE

## 2021-06-23 VITALS
TEMPERATURE: 99 F | SYSTOLIC BLOOD PRESSURE: 171 MMHG | DIASTOLIC BLOOD PRESSURE: 79 MMHG | WEIGHT: 110.01 LBS | HEIGHT: 55 IN | HEART RATE: 69 BPM | OXYGEN SATURATION: 100 % | RESPIRATION RATE: 16 BRPM

## 2021-06-23 DIAGNOSIS — Z90.710 ACQUIRED ABSENCE OF BOTH CERVIX AND UTERUS: Chronic | ICD-10-CM

## 2021-06-23 LAB
ALBUMIN SERPL ELPH-MCNC: 4.3 G/DL — SIGNIFICANT CHANGE UP (ref 3.3–5)
ALP SERPL-CCNC: 117 U/L — SIGNIFICANT CHANGE UP (ref 40–120)
ALT FLD-CCNC: 27 U/L — SIGNIFICANT CHANGE UP (ref 10–45)
ANION GAP SERPL CALC-SCNC: 16 MMOL/L — SIGNIFICANT CHANGE UP (ref 5–17)
APPEARANCE UR: CLEAR — SIGNIFICANT CHANGE UP
AST SERPL-CCNC: 30 U/L — SIGNIFICANT CHANGE UP (ref 10–40)
BASE EXCESS BLDV CALC-SCNC: 3.4 MMOL/L — HIGH (ref -2–2)
BASOPHILS # BLD AUTO: 0.04 K/UL — SIGNIFICANT CHANGE UP (ref 0–0.2)
BASOPHILS NFR BLD AUTO: 0.6 % — SIGNIFICANT CHANGE UP (ref 0–2)
BILIRUB SERPL-MCNC: 0.3 MG/DL — SIGNIFICANT CHANGE UP (ref 0.2–1.2)
BILIRUB UR-MCNC: NEGATIVE — SIGNIFICANT CHANGE UP
BUN SERPL-MCNC: 15 MG/DL — SIGNIFICANT CHANGE UP (ref 7–23)
CA-I SERPL-SCNC: 1.15 MMOL/L — SIGNIFICANT CHANGE UP (ref 1.12–1.3)
CALCIUM SERPL-MCNC: 9.2 MG/DL — SIGNIFICANT CHANGE UP (ref 8.4–10.5)
CHLORIDE BLDV-SCNC: 104 MMOL/L — SIGNIFICANT CHANGE UP (ref 96–108)
CHLORIDE SERPL-SCNC: 101 MMOL/L — SIGNIFICANT CHANGE UP (ref 96–108)
CO2 BLDV-SCNC: 32 MMOL/L — HIGH (ref 22–30)
CO2 SERPL-SCNC: 21 MMOL/L — LOW (ref 22–31)
COLOR SPEC: COLORLESS — SIGNIFICANT CHANGE UP
CREAT SERPL-MCNC: 0.44 MG/DL — LOW (ref 0.5–1.3)
DIFF PNL FLD: NEGATIVE — SIGNIFICANT CHANGE UP
EOSINOPHIL # BLD AUTO: 0.24 K/UL — SIGNIFICANT CHANGE UP (ref 0–0.5)
EOSINOPHIL NFR BLD AUTO: 3.5 % — SIGNIFICANT CHANGE UP (ref 0–6)
GAS PNL BLDV: 136 MMOL/L — SIGNIFICANT CHANGE UP (ref 135–145)
GAS PNL BLDV: SIGNIFICANT CHANGE UP
GAS PNL BLDV: SIGNIFICANT CHANGE UP
GLUCOSE BLDV-MCNC: 141 MG/DL — HIGH (ref 70–99)
GLUCOSE SERPL-MCNC: 134 MG/DL — HIGH (ref 70–99)
GLUCOSE UR QL: NEGATIVE — SIGNIFICANT CHANGE UP
HCO3 BLDV-SCNC: 30 MMOL/L — HIGH (ref 21–29)
HCT VFR BLD CALC: 41.3 % — SIGNIFICANT CHANGE UP (ref 34.5–45)
HCT VFR BLDA CALC: 43 % — SIGNIFICANT CHANGE UP (ref 39–50)
HGB BLD CALC-MCNC: 14 G/DL — SIGNIFICANT CHANGE UP (ref 11.5–15.5)
HGB BLD-MCNC: 13.1 G/DL — SIGNIFICANT CHANGE UP (ref 11.5–15.5)
IMM GRANULOCYTES NFR BLD AUTO: 1 % — SIGNIFICANT CHANGE UP (ref 0–1.5)
KETONES UR-MCNC: NEGATIVE — SIGNIFICANT CHANGE UP
LACTATE BLDV-MCNC: 1.6 MMOL/L — SIGNIFICANT CHANGE UP (ref 0.7–2)
LEUKOCYTE ESTERASE UR-ACNC: NEGATIVE — SIGNIFICANT CHANGE UP
LYMPHOCYTES # BLD AUTO: 1.7 K/UL — SIGNIFICANT CHANGE UP (ref 1–3.3)
LYMPHOCYTES # BLD AUTO: 24.6 % — SIGNIFICANT CHANGE UP (ref 13–44)
MCHC RBC-ENTMCNC: 25 PG — LOW (ref 27–34)
MCHC RBC-ENTMCNC: 31.7 GM/DL — LOW (ref 32–36)
MCV RBC AUTO: 78.8 FL — LOW (ref 80–100)
MONOCYTES # BLD AUTO: 0.66 K/UL — SIGNIFICANT CHANGE UP (ref 0–0.9)
MONOCYTES NFR BLD AUTO: 9.6 % — SIGNIFICANT CHANGE UP (ref 2–14)
NEUTROPHILS # BLD AUTO: 4.19 K/UL — SIGNIFICANT CHANGE UP (ref 1.8–7.4)
NEUTROPHILS NFR BLD AUTO: 60.7 % — SIGNIFICANT CHANGE UP (ref 43–77)
NITRITE UR-MCNC: NEGATIVE — SIGNIFICANT CHANGE UP
NRBC # BLD: 0 /100 WBCS — SIGNIFICANT CHANGE UP (ref 0–0)
OB PNL STL: NEGATIVE — SIGNIFICANT CHANGE UP
PCO2 BLDV: 56 MMHG — HIGH (ref 35–50)
PH BLDV: 7.35 — SIGNIFICANT CHANGE UP (ref 7.35–7.45)
PH UR: 7 — SIGNIFICANT CHANGE UP (ref 5–8)
PLATELET # BLD AUTO: 235 K/UL — SIGNIFICANT CHANGE UP (ref 150–400)
PO2 BLDV: 22 MMHG — LOW (ref 25–45)
POTASSIUM BLDV-SCNC: 4 MMOL/L — SIGNIFICANT CHANGE UP (ref 3.5–5.3)
POTASSIUM SERPL-MCNC: 4.9 MMOL/L — SIGNIFICANT CHANGE UP (ref 3.5–5.3)
POTASSIUM SERPL-SCNC: 4.9 MMOL/L — SIGNIFICANT CHANGE UP (ref 3.5–5.3)
PROT SERPL-MCNC: 7.6 G/DL — SIGNIFICANT CHANGE UP (ref 6–8.3)
PROT UR-MCNC: NEGATIVE — SIGNIFICANT CHANGE UP
RBC # BLD: 5.24 M/UL — HIGH (ref 3.8–5.2)
RBC # FLD: 16.2 % — HIGH (ref 10.3–14.5)
SAO2 % BLDV: 27 % — LOW (ref 67–88)
SARS-COV-2 RNA SPEC QL NAA+PROBE: SIGNIFICANT CHANGE UP
SODIUM SERPL-SCNC: 138 MMOL/L — SIGNIFICANT CHANGE UP (ref 135–145)
SP GR SPEC: 1.01 — LOW (ref 1.01–1.02)
UROBILINOGEN FLD QL: NEGATIVE — SIGNIFICANT CHANGE UP
WBC # BLD: 6.9 K/UL — SIGNIFICANT CHANGE UP (ref 3.8–10.5)
WBC # FLD AUTO: 6.9 K/UL — SIGNIFICANT CHANGE UP (ref 3.8–10.5)

## 2021-06-23 PROCEDURE — 99284 EMERGENCY DEPT VISIT MOD MDM: CPT | Mod: 25

## 2021-06-23 PROCEDURE — 81003 URINALYSIS AUTO W/O SCOPE: CPT

## 2021-06-23 PROCEDURE — U0005: CPT

## 2021-06-23 PROCEDURE — 82803 BLOOD GASES ANY COMBINATION: CPT

## 2021-06-23 PROCEDURE — 93010 ELECTROCARDIOGRAM REPORT: CPT

## 2021-06-23 PROCEDURE — 99284 EMERGENCY DEPT VISIT MOD MDM: CPT

## 2021-06-23 PROCEDURE — 84295 ASSAY OF SERUM SODIUM: CPT

## 2021-06-23 PROCEDURE — 85025 COMPLETE CBC W/AUTO DIFF WBC: CPT

## 2021-06-23 PROCEDURE — 93005 ELECTROCARDIOGRAM TRACING: CPT

## 2021-06-23 PROCEDURE — 82272 OCCULT BLD FECES 1-3 TESTS: CPT

## 2021-06-23 PROCEDURE — 85014 HEMATOCRIT: CPT

## 2021-06-23 PROCEDURE — 85018 HEMOGLOBIN: CPT

## 2021-06-23 PROCEDURE — 84132 ASSAY OF SERUM POTASSIUM: CPT

## 2021-06-23 PROCEDURE — 83605 ASSAY OF LACTIC ACID: CPT

## 2021-06-23 PROCEDURE — 82330 ASSAY OF CALCIUM: CPT

## 2021-06-23 PROCEDURE — U0003: CPT

## 2021-06-23 PROCEDURE — 80053 COMPREHEN METABOLIC PANEL: CPT

## 2021-06-23 PROCEDURE — 82565 ASSAY OF CREATININE: CPT

## 2021-06-23 PROCEDURE — 82947 ASSAY GLUCOSE BLOOD QUANT: CPT

## 2021-06-23 PROCEDURE — 82435 ASSAY OF BLOOD CHLORIDE: CPT

## 2021-06-23 RX ORDER — DIPHENHYDRAMINE HCL 50 MG
25 CAPSULE ORAL ONCE
Refills: 0 | Status: COMPLETED | OUTPATIENT
Start: 2021-06-23 | End: 2021-06-23

## 2021-06-23 RX ADMIN — Medication 1 APPLICATION(S): at 22:56

## 2021-06-23 RX ADMIN — Medication 25 MILLIGRAM(S): at 19:52

## 2021-06-23 NOTE — ED ADULT TRIAGE NOTE - CHIEF COMPLAINT QUOTE
nausea and upset stomach x 1 month, thin stools, they were black, mouth dry, and memory loss as per family. Today she has red spots on the neck/arms/legs nausea and upset stomach x 1 month, thin stools, they were black, mouth dry, and memory loss as per family. Today she has red spots on the neck/arms/legs. Pt is unsure of date.

## 2021-06-23 NOTE — ED PROVIDER NOTE - PROGRESS NOTE DETAILS
Attending MD Vega: daughter contacted, instructed to return to ED to  her mother. STates she is in the [place] and will try to make her way back out to this area to pick her up. Pt's daughter arrived to ER. Pt requiring non-emergent transport. ED Rep Peña aware and facilitating. - Faustina Bates PA-C

## 2021-06-23 NOTE — ED ADULT NURSE NOTE - CHIEF COMPLAINT QUOTE
nausea and upset stomach x 1 month, thin stools, they were black, mouth dry, and memory loss as per family. Today she has red spots on the neck/arms/legs. Pt is unsure of date.

## 2021-06-23 NOTE — CHART NOTE - NSCHARTNOTEFT_GEN_A_CORE
Emergency Room : Per medical team patient is medically cleared for discharge.  Patient is an 81 year old  female presented to the ED for abdominal pain. Patient was accompanied by her daughter Quiana.  LSMW was informed by the medical team they were unable to locate the patient's daughter.  LSMW contacted Emergency contact, Mr. Wong ([phone])to assist with discharge transportation.  Mr Wong informed the patient's daughter is visiting and the  patient has been staying with her at another location.  Mr Wong suggested contacting her daughter.  LMSW met with patient with the assistance of staff member translated as the patient is Georgian speaking.  Patient provided the contact number of the patient's daughter, Quiana ([phone]).  Daughter informed she left the hospital and she would be picking up the patient tomorrow. LMSW informed the patient is cleared for discharge and needs to be picked up.  Daughter explained she is far away from the hospital and it will take some time to pick her up. LMSW acknowledged and advised the patient will be awaiting her arrival. Update to the medical team. LMSW will follow up as needed.

## 2021-06-23 NOTE — ED PROVIDER NOTE - CLINICAL SUMMARY MEDICAL DECISION MAKING FREE TEXT BOX
Attending MD Vega:   81F presenting with daughter for evaluation of pruritic rash, chronic intermittent abd pain. Exam with nontender abdomen, patient well appearing. Labs in triage with normal hemoglobin, reassuring CMP. Rash papular mostly over neck, not suspicious for SJS/TEN or more sinister pathology. Plan for steroid cream, outpatient derm follow up. Abdominal imaging not warranted given benign exam. Patient needs primary care doctor, will refer to one      *The above represents an initial assessment/impression. Please refer to progress notes for potential changes in patient clinical course*

## 2021-06-23 NOTE — ED PROVIDER NOTE - ATTENDING CONTRIBUTION TO CARE
Attending MD Vega:   I personally have seen and examined this patient.  Physician assistant note reviewed and agree on plan of care and except where noted.  See HPI, PE, and MDM for details.

## 2021-06-23 NOTE — ED PROVIDER NOTE - NSFOLLOWUPINSTRUCTIONS_ED_ALL_ED_FT
Please apply triamcinolone ointment twice daily to areas of rash.      Follow up with your Primary Care Provider upon discharge.   You may follow up with [facility]: call [phone] to make appointment.   Bring a copy of your test results (attached along with your discharge paperwork) with you to your appointment for further discussion, evaluation, and comparison with your prior results.    Please return to the Emergency Department immediately for any new, worsening, or concerning symptoms. Please apply triamcinolone ointment twice daily to areas of rash.      Follow up with your Primary Care Provider upon discharge.   You may follow up with [facility]: call [phone] to make appointment.   Bring a copy of your test results (attached along with your discharge paperwork) with you to your appointment for further discussion, evaluation, and comparison with your prior results.    Please also follow up with Dermatology this week. Information provided.     Please return to the Emergency Department immediately for any new, worsening, or concerning symptoms.

## 2021-06-23 NOTE — ED PROVIDER NOTE - NSFOLLOWUPCLINICS_GEN_ALL_ED_FT
[facility]  Dermatology  [address]  [place] [address]  Phone: [phone]  Fax: [phone]  Follow Up Time: 1-3 Days    [facility]  Dermatology  [address]  [place] [address]  Phone: [phone]  Fax: [phone]  Follow Up Time: 1-3 Days    [facility] Internal [facility]  General Internal Medicine  [address] [place] [address]  Phone: [phone]  Fax:   Follow Up Time: 1-3 Days

## 2021-06-23 NOTE — ED ADULT NURSE REASSESSMENT NOTE - NS ED NURSE REASSESS COMMENT FT1
Report received from Ghazal AU. Pt d/c, currently awaiting nonemergent transportation home with daughter at bedside.

## 2021-06-23 NOTE — ED ADULT NURSE NOTE - OBJECTIVE STATEMENT
81y female arrived to ED complaining of nausea and stomach discomfort x1 month. Patient endorses black stools and confusion. Patient accompanied by daughter. Patient denies CP, SOB, n/v, chills, fever. Patient Kiswahili speaking. 81y female arrived to ED complaining of nausea and stomach discomfort for over a month. Patient accompanied by daughter who is in from out of town. Italian speaking. A&Ox2. Poor historian and inconsistent story between endorses black stools and confusion. Patient denies CP, SOB, n/v, chills, fever. Denies abd pain at this time. VS stable.

## 2021-06-23 NOTE — ED ADULT NURSE REASSESSMENT NOTE - NS ED NURSE REASSESS COMMENT FT1
pt seen by quick Doc.  IV established in flowsheet as per provider orders.  blood specimens obtained and sent to lab.  Pt educated on plan of care and process.  pending being seen in main ED.

## 2021-06-23 NOTE — ED PROVIDER NOTE - RAPID ASSESSMENT
81y F presents to the ED c/o nausea and upset stomach x 1 month.   Pt endorses diarrhea and black stools. Pt also notes red spots on her arms, neck, and legs. Per triage note, pt also endorses increased confusion.  Denies nausea, vomiting, CP, difficulty breathing, fevers.     I, Mari Mark), have documented this rapid assessment note under the dictation of Davon Mcclendon) , which has been reviewed and affirmed to be accurate. 81y F presents to the ED c/o nausea and upset stomach x 1 month. Pt endorses diarrhea and black stools. Pt also notes red spots on her arms, neck, and legs. Per triage note, pt also endorses increased confusion. Denies nausea, vomiting, CP, difficulty breathing, fevers.     Mari ORTIZ (Sintia), have documented this rapid assessment note under the dictation of Davon Mcclendon) , which has been reviewed and affirmed to be accurate.    Davon ORTIZ PA-C saw patient as a rapid assessment initially via telemedicine encounter. The rest of care to be performed by the primary ED team. Rapid assessment documented by sintia in my presence. I have personally reviewed and approved the note written by the sintia. Receiving team will follow up on labs, analgesia, any clinical imaging, and perform reassessment and disposition of the patient as clinically indicated. All decisions regarding the progression of care will be made at their discretion.

## 2021-06-23 NOTE — ED ADULT NURSE NOTE - NSIMPLEMENTINTERV_GEN_ALL_ED
Implemented All Fall Risk Interventions:  [place] to call system. Call bell, personal items and telephone within reach. Instruct patient to call for assistance. Room bathroom lighting operational. Non-slip footwear when patient is off stretcher. Physically safe environment: no spills, clutter or unnecessary equipment. Stretcher in lowest position, wheels locked, appropriate side rails in place. Provide visual cue, wrist band, yellow gown, etc. Monitor gait and stability. Monitor for mental status changes and reorient to person, place, and time. Review medications for side effects contributing to fall risk. Reinforce activity limits and safety measures with patient and family.

## 2021-06-23 NOTE — ED PROVIDER NOTE - OBJECTIVE STATEMENT
82yo German speaking F with no known PMH, no PSH, has not seen a doctor in many years presenting with daughter at bedside c/o intermittent abdominal pain x 3 months.  Jose E #[id number] used. Reports "gas-like" pains that come and go for last three months with associated loose stools and diarrhea that are occasionally black in color. Daughter reports she brought patient to ER today because patient was c/o worsening abdominal pain earlier, however pt currently denying any abdominal pain. Pt also c/o new pruritic rash since yesterday. Reports rash to posterior neck and hands, also with some scattered rash on b/l legs. Has not taken anything or applied anything to rash. Daughter reports she is visiting from another country, pt lives here alone and is checked in on by friends, and noticed that patient has been having memory trouble. Daughter reports she wants patient to come back with her but patient does not want to. Pt denies any fever/chills, CP, SOB, cough, n/v, urinary symptoms, trouble breathing/swallowing, any known allergies or new exposures. Does not take any daily meds. 80yo Kinyarwanda speaking F with no known PMH, no PSH, has not seen a doctor in many years presenting with daughter at bedside c/o intermittent abdominal pain x 3 months.  Jose E #[id number] used. Reports "gas-like" pains that come and go for last three months with associated loose stools and diarrhea that are occasionally black in color. Daughter reports she brought patient to ER today because patient was c/o worsening abdominal pain earlier, however pt currently denying any abdominal pain. Pt also c/o new pruritic rash since yesterday. Reports rash to posterior neck, hands, and b/l legs. Has not taken anything or applied anything to rash. Daughter reports she is visiting from another country, pt lives here alone and is checked in on by friends, and noticed that patient has been having memory trouble. Daughter reports she wants patient to come back to her country with her but patient does not want to. Pt denies any fever/chills, CP, SOB, cough, n/v, urinary symptoms, trouble breathing/swallowing, any known allergies or new exposures. Does not take any daily meds.

## 2021-06-23 NOTE — ED PROVIDER NOTE - PATIENT PORTAL LINK FT
You can access the FollowMyHealth Patient Portal offered by [facility] by registering at the following website: [email or web address]. By joining [facility]’s FollowMyHealth portal, you will also be able to view your health information using other applications (apps) compatible with our system.

## 2021-06-24 VITALS
DIASTOLIC BLOOD PRESSURE: 78 MMHG | OXYGEN SATURATION: 99 % | HEART RATE: 70 BPM | SYSTOLIC BLOOD PRESSURE: 127 MMHG | TEMPERATURE: 98 F | RESPIRATION RATE: 18 BRPM

## 2021-06-24 RX ORDER — HYDROCORTISONE 1 %
1 OINTMENT (GRAM) TOPICAL
Qty: 30 | Refills: 0
Start: 2021-06-24 | End: 2021-06-30

## 2021-06-24 NOTE — ED ADULT NURSE REASSESSMENT NOTE - NS ED NURSE REASSESS COMMENT FT1
Pt's daughter states that pt's throat hurts. Daughter repeatedly approaching Peña (rep) at red desk to ask for help despite telling daughter that we have a  iPad in this section of the ED. Jana FATIMA assessed pt's throat & reports that it was unremarkable & reinforced education that pt should f/u with PCP after first c/o that pt's throat hurt. Annie FINE aware & states that pt is d/c & based on exam, she is safe to be d/c home with PCP f/u, no acute intervention necessary at this time.

## 2021-06-24 NOTE — ED POST DISCHARGE NOTE - RESULT SUMMARY
Pt called wondering where Rx for steroid cream was.  Was mentioned in the chart DC instructions but no script sent.  Will send hydrocortisone 2.5% cream to the pharmacy they choose.  Deana

## 2021-07-30 ENCOUNTER — APPOINTMENT (OUTPATIENT)
Dept: NEUROLOGY | Facility: CLINIC | Age: 81
End: 2021-07-30

## 2021-08-03 ENCOUNTER — INPATIENT (INPATIENT)
Facility: HOSPITAL | Age: 81
LOS: 6 days | Discharge: HOME | DRG: 913 | End: 2021-08-10
Attending: INTERNAL MEDICINE | Admitting: INTERNAL MEDICINE
Payer: MEDICARE

## 2021-08-03 VITALS
WEIGHT: 119.93 LBS | DIASTOLIC BLOOD PRESSURE: 79 MMHG | HEART RATE: 66 BPM | RESPIRATION RATE: 18 BRPM | HEIGHT: 55 IN | OXYGEN SATURATION: 99 % | TEMPERATURE: 98 F | SYSTOLIC BLOOD PRESSURE: 129 MMHG

## 2021-08-03 DIAGNOSIS — R68.89 OTHER GENERAL SYMPTOMS AND SIGNS: ICD-10-CM

## 2021-08-03 DIAGNOSIS — Z29.9 ENCOUNTER FOR PROPHYLACTIC MEASURES, UNSPECIFIED: ICD-10-CM
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
## 2021-08-03 DIAGNOSIS — E03.9 HYPOTHYROIDISM, UNSPECIFIED: ICD-10-CM

## 2021-08-03 DIAGNOSIS — R63.8 OTHER SYMPTOMS AND SIGNS CONCERNING FOOD AND FLUID INTAKE: ICD-10-CM

## 2021-08-03 DIAGNOSIS — Z90.710 ACQUIRED ABSENCE OF BOTH CERVIX AND UTERUS: Chronic | ICD-10-CM

## 2021-08-03 DIAGNOSIS — S09.90XA UNSPECIFIED INJURY OF HEAD, INITIAL ENCOUNTER: ICD-10-CM

## 2021-08-03 LAB
ALBUMIN SERPL ELPH-MCNC: 4.8 G/DL — SIGNIFICANT CHANGE UP (ref 3.3–5)
ALP SERPL-CCNC: 95 U/L — SIGNIFICANT CHANGE UP (ref 40–120)
ALT FLD-CCNC: 23 U/L — SIGNIFICANT CHANGE UP (ref 10–45)
ANION GAP SERPL CALC-SCNC: 11 MMOL/L — SIGNIFICANT CHANGE UP (ref 5–17)
APPEARANCE UR: ABNORMAL
AST SERPL-CCNC: 24 U/L — SIGNIFICANT CHANGE UP (ref 10–40)
BACTERIA # UR AUTO: NEGATIVE — SIGNIFICANT CHANGE UP
BASE EXCESS BLDV CALC-SCNC: 4.8 MMOL/L — HIGH (ref -2–2)
BASOPHILS # BLD AUTO: 0.03 K/UL — SIGNIFICANT CHANGE UP (ref 0–0.2)
BASOPHILS NFR BLD AUTO: 0.5 % — SIGNIFICANT CHANGE UP (ref 0–2)
BILIRUB SERPL-MCNC: 0.6 MG/DL — SIGNIFICANT CHANGE UP (ref 0.2–1.2)
BILIRUB UR-MCNC: NEGATIVE — SIGNIFICANT CHANGE UP
BUN SERPL-MCNC: 15 MG/DL — SIGNIFICANT CHANGE UP (ref 7–23)
CA-I SERPL-SCNC: 1.11 MMOL/L — LOW (ref 1.12–1.3)
CALCIUM SERPL-MCNC: 9.5 MG/DL — SIGNIFICANT CHANGE UP (ref 8.4–10.5)
CHLORIDE BLDV-SCNC: 104 MMOL/L — SIGNIFICANT CHANGE UP (ref 96–108)
CHLORIDE SERPL-SCNC: 100 MMOL/L — SIGNIFICANT CHANGE UP (ref 96–108)
CO2 BLDV-SCNC: 33 MMOL/L — HIGH (ref 22–30)
CO2 SERPL-SCNC: 28 MMOL/L — SIGNIFICANT CHANGE UP (ref 22–31)
COLOR SPEC: SIGNIFICANT CHANGE UP
CREAT SERPL-MCNC: 0.47 MG/DL — LOW (ref 0.5–1.3)
DIFF PNL FLD: NEGATIVE — SIGNIFICANT CHANGE UP
EOSINOPHIL # BLD AUTO: 0.17 K/UL — SIGNIFICANT CHANGE UP (ref 0–0.5)
EOSINOPHIL NFR BLD AUTO: 2.9 % — SIGNIFICANT CHANGE UP (ref 0–6)
EPI CELLS # UR: 1 /HPF — SIGNIFICANT CHANGE UP
GAS PNL BLDV: 136 MMOL/L — SIGNIFICANT CHANGE UP (ref 135–145)
GAS PNL BLDV: SIGNIFICANT CHANGE UP
GAS PNL BLDV: SIGNIFICANT CHANGE UP
GLUCOSE BLDV-MCNC: 111 MG/DL — HIGH (ref 70–99)
GLUCOSE SERPL-MCNC: 118 MG/DL — HIGH (ref 70–99)
GLUCOSE UR QL: NEGATIVE — SIGNIFICANT CHANGE UP
HCO3 BLDV-SCNC: 32 MMOL/L — HIGH (ref 21–29)
HCT VFR BLD CALC: 42.9 % — SIGNIFICANT CHANGE UP (ref 34.5–45)
HCT VFR BLDA CALC: 43 % — SIGNIFICANT CHANGE UP (ref 39–50)
HGB BLD CALC-MCNC: 14 G/DL — SIGNIFICANT CHANGE UP (ref 11.5–15.5)
HGB BLD-MCNC: 13.8 G/DL — SIGNIFICANT CHANGE UP (ref 11.5–15.5)
HYALINE CASTS # UR AUTO: 1 /LPF — SIGNIFICANT CHANGE UP (ref 0–2)
IMM GRANULOCYTES NFR BLD AUTO: 1.2 % — SIGNIFICANT CHANGE UP (ref 0–1.5)
KETONES UR-MCNC: NEGATIVE — SIGNIFICANT CHANGE UP
LACTATE BLDV-MCNC: 1.3 MMOL/L — SIGNIFICANT CHANGE UP (ref 0.7–2)
LEUKOCYTE ESTERASE UR-ACNC: NEGATIVE — SIGNIFICANT CHANGE UP
LYMPHOCYTES # BLD AUTO: 1.35 K/UL — SIGNIFICANT CHANGE UP (ref 1–3.3)
LYMPHOCYTES # BLD AUTO: 23.3 % — SIGNIFICANT CHANGE UP (ref 13–44)
MCHC RBC-ENTMCNC: 25.9 PG — LOW (ref 27–34)
MCHC RBC-ENTMCNC: 32.2 GM/DL — SIGNIFICANT CHANGE UP (ref 32–36)
MCV RBC AUTO: 80.6 FL — SIGNIFICANT CHANGE UP (ref 80–100)
MONOCYTES # BLD AUTO: 0.59 K/UL — SIGNIFICANT CHANGE UP (ref 0–0.9)
MONOCYTES NFR BLD AUTO: 10.2 % — SIGNIFICANT CHANGE UP (ref 2–14)
NEUTROPHILS # BLD AUTO: 3.59 K/UL — SIGNIFICANT CHANGE UP (ref 1.8–7.4)
NEUTROPHILS NFR BLD AUTO: 61.9 % — SIGNIFICANT CHANGE UP (ref 43–77)
NITRITE UR-MCNC: NEGATIVE — SIGNIFICANT CHANGE UP
NRBC # BLD: 0 /100 WBCS — SIGNIFICANT CHANGE UP (ref 0–0)
NT-PROBNP SERPL-SCNC: 107 PG/ML — SIGNIFICANT CHANGE UP (ref 0–300)
PCO2 BLDV: 59 MMHG — HIGH (ref 35–50)
PH BLDV: 7.35 — SIGNIFICANT CHANGE UP (ref 7.35–7.45)
PH UR: 7 — SIGNIFICANT CHANGE UP (ref 5–8)
PLATELET # BLD AUTO: 248 K/UL — SIGNIFICANT CHANGE UP (ref 150–400)
PO2 BLDV: 22 MMHG — LOW (ref 25–45)
POTASSIUM BLDV-SCNC: 4.6 MMOL/L — SIGNIFICANT CHANGE UP (ref 3.5–5.3)
POTASSIUM SERPL-MCNC: 4.1 MMOL/L — SIGNIFICANT CHANGE UP (ref 3.5–5.3)
POTASSIUM SERPL-SCNC: 4.1 MMOL/L — SIGNIFICANT CHANGE UP (ref 3.5–5.3)
PROT SERPL-MCNC: 7.9 G/DL — SIGNIFICANT CHANGE UP (ref 6–8.3)
PROT UR-MCNC: SIGNIFICANT CHANGE UP
RBC # BLD: 5.32 M/UL — HIGH (ref 3.8–5.2)
RBC # FLD: 16 % — HIGH (ref 10.3–14.5)
RBC CASTS # UR COMP ASSIST: 1 /HPF — SIGNIFICANT CHANGE UP (ref 0–4)
SAO2 % BLDV: 29 % — LOW (ref 67–88)
SARS-COV-2 RNA SPEC QL NAA+PROBE: SIGNIFICANT CHANGE UP
SODIUM SERPL-SCNC: 139 MMOL/L — SIGNIFICANT CHANGE UP (ref 135–145)
SP GR SPEC: 1.01 — SIGNIFICANT CHANGE UP (ref 1.01–1.02)
TROPONIN T, HIGH SENSITIVITY RESULT: 6 NG/L — SIGNIFICANT CHANGE UP (ref 0–51)
TROPONIN T, HIGH SENSITIVITY RESULT: 8 NG/L — SIGNIFICANT CHANGE UP (ref 0–51)
UROBILINOGEN FLD QL: NEGATIVE — SIGNIFICANT CHANGE UP
WBC # BLD: 5.8 K/UL — SIGNIFICANT CHANGE UP (ref 3.8–10.5)
WBC # FLD AUTO: 5.8 K/UL — SIGNIFICANT CHANGE UP (ref 3.8–10.5)
WBC UR QL: 1 /HPF — SIGNIFICANT CHANGE UP (ref 0–5)

## 2021-08-03 PROCEDURE — 70450 CT HEAD/BRAIN W/O DYE: CPT | Mod: 26

## 2021-08-03 PROCEDURE — 71045 X-RAY EXAM CHEST 1 VIEW: CPT | Mod: 26

## 2021-08-03 PROCEDURE — 72125 CT NECK SPINE W/O DYE: CPT | Mod: 26

## 2021-08-03 PROCEDURE — 99223 1ST HOSP IP/OBS HIGH 75: CPT

## 2021-08-03 PROCEDURE — 93010 ELECTROCARDIOGRAM REPORT: CPT

## 2021-08-03 PROCEDURE — 99285 EMERGENCY DEPT VISIT HI MDM: CPT

## 2021-08-03 RX ORDER — LEVOTHYROXINE SODIUM 125 MCG
50 TABLET ORAL DAILY
Refills: 0 | Status: DISCONTINUED | OUTPATIENT
Start: 2021-08-03 | End: 2021-08-10

## 2021-08-03 NOTE — H&P ADULT - ASSESSMENT
81 year old female with PMH hypothyroidism who presents after a fall. Patient very forgetful-attempted to reach daughter for more history but unable to so history obtained from chart as well as friend Mya ([phone]). 81 year old female with PMH hypothyroidism who presents after a fall.

## 2021-08-03 NOTE — ED ADULT NURSE NOTE - OBJECTIVE STATEMENT
82 yo female A&Ox2, HLD,  hypothyroidism, osteoporosis, presents to ED c/o of fall yesterday. Pt Bulgarian speaking with family at bedside, pt unable to articulate the events leading up to and after the fall. Per family she is  more confused than normal and is c/o of posterior head pain. No obvious external deformity noted, no cspine or midline spinal tenderness. Motor, strength and sensation intact in upper and lower extremities. PERRL 3R bilaterally, EOMI. CM applied, EKG done. Denies CP, SOB, n/v/d, fevers, chills, abdominal pain, urinary symptoms, weakness, fatigue, numbness, tingling in upper and lower extremities,  blurry vision. VSS updated on plan of care.

## 2021-08-03 NOTE — ED PROVIDER NOTE - ATTENDING CONTRIBUTION TO CARE
Private Physician Antony Wise PCP  Hx from daughter   81y female pmh Arthritis, HLD, Hypothyrodism, Osteoporosis, On no meds, pt comes to ed c/o fall yesterday. ?syncope. Pt brought to ed c/o ha occiput. pain nausea without vomiting. pt has no recall of e events of yesterday. More confused than usual. PE Elderly female awake alert speech fluent.  Heent normocephalic atraumatic neck supple chest clear cv no rubs, gallops or murmurs abd soft +bs no mass guarding. Neuro gcs 14 oriented x2 power 5/5 all extr pain light touch intact,   Joseph Jones MD, Facep

## 2021-08-03 NOTE — H&P ADULT - NSICDXPASTMEDICALHX_GEN_ALL_CORE_FT
PAST MEDICAL HISTORY:  Arthritis     COVID-19 vaccine series completed     HLD (hyperlipidemia)     Hypothyroid     Osteoporosis

## 2021-08-03 NOTE — ED PROVIDER NOTE - CARE PLAN
Principal Discharge DX:	Closed head injury   Principal Discharge DX:	Closed head injury  Secondary Diagnosis:	Syncope

## 2021-08-03 NOTE — H&P ADULT - NSHPLABSRESULTS_GEN_ALL_CORE
.  LABS:                         13.8   5.80  )-----------( 248      ( 03 Aug 2021 07:57 )             42.9         139  |  100  |  15  ----------------------------<  118<H>  4.1   |  28  |  0.47<L>    Ca    9.5      03 Aug 2021 07:57    TPro  7.9  /  Alb  4.8  /  TBili  0.6  /  DBili  x   /  AST  24  /  ALT  23  /  AlkPhos  95        Urinalysis Basic - ( 03 Aug 2021 09:40 )    Color: Light Yellow / Appearance: Slightly Turbid / S.015 / pH: x  Gluc: x / Ketone: Negative  / Bili: Negative / Urobili: Negative   Blood: x / Protein: Trace / Nitrite: Negative   Leuk Esterase: Negative / RBC: 1 /hpf / WBC 1 /HPF   Sq Epi: x / Non Sq Epi: 1 /hpf / Bacteria: Negative          Serum Pro-Brain Natriuretic Peptide: 107 pg/mL ( @ 07:57)

## 2021-08-03 NOTE — H&P ADULT - HISTORY OF PRESENT ILLNESS
81 year old female with PMH hypothyroidism who presents after a fall. Patient very forgetful-attempted to reach daughter for more history but unable to so history obtained from chart as well as friend Mya ([phone]). The patient had a mechanical fall on Monday-she denies any prodromal symptoms such as dizziness, lightheadedness, chest pain, shortness of breath or palpitations. She developed a headache and was told to go to the ER by her PCP and was brought in by her daughter. The patient complains of forgetfulness. When discussed with Mya, it has been going on for 10-12 years and only includes minor issues like forgetting where she put her phone or what she ate but the patient remains mostly independent Additionally, Mya believes that the patient is not complaint with her thyroid medication.  Upon arrival, vital signs were /79, HR 66, RR 18, temperature 98 degrees Farenheit and saturating 99% on room air. Labs and imaging were without acute findings and she was admitted for further workup and management of her fall. 81 year old female with PMH hypothyroidism who presents after a fall. Patient very forgetful-attempted to reach daughter for more history but unable to so history obtained from chart as well as friend Mya ([phone]). The patient had a mechanical fall on Monday-she denies any prodromal symptoms such as dizziness, lightheadedness, chest pain, shortness of breath or palpitations. She developed a headache and was told to go to the ER by her PCP and was brought in by her daughter. The patient complains of forgetfulness. When discussed with Mya, it has been going on for 10-12 years and only includes minor issues like forgetting where she put her phone or what she ate but the patient remains mostly independent. Additionally, Mya believes that the patient is not complaint with her thyroid medication.  Upon arrival, vital signs were /79, HR 66, RR 18, temperature 98 degrees Farenheit and saturating 99% on room air. Labs and imaging were without acute findings and she was admitted for further workup and management of her fall.

## 2021-08-03 NOTE — ED ADULT TRIAGE NOTE - BP NONINVASIVE DIASTOLIC (MM HG)
78  Heart Attack Brother      SOCIAL HISTORY:  Social History     Tobacco Use    Smoking status: Former Smoker     Types: Cigarettes     Last attempt to quit:      Years since quittin.2    Smokeless tobacco: Former User   Substance Use Topics    Alcohol use: Yes     Alcohol/week: 2.0 standard drinks     Types: 2 Shots of liquor per week     Comment: daily    Drug use: Never     ALLERGIES:  No Known Allergies  HOME MEDICATIONS:  Prior to Admission medications    Medication Sig Start Date End Date Taking?  Authorizing Provider   amoxicillin-clavulanate (AUGMENTIN) 875-125 MG per tablet Take 1 tablet by mouth every 12 hours for 7 days 3/25/20 4/1/20 Yes April DARRYL Renteria - CNP   doxycycline hyclate (VIBRAMYCIN) 100 MG capsule Take 1 capsule by mouth every 12 hours for 7 days 3/25/20 4/1/20 Yes April DARRYL Renteria - CNP   ferrous sulfate (IRON 325) 325 (65 Fe) MG tablet Take 1 tablet by mouth 2 times daily (with meals) 3/25/20  Yes DARRYL Levy   terazosin (HYTRIN) 2 MG capsule Take 2 mg by mouth nightly   Yes Historical Provider, MD   latanoprost (XALATAN) 0.005 % ophthalmic solution Place 1 drop into the right eye nightly   Yes Historical Provider, MD   docusate sodium (COLACE) 100 MG capsule Take 100 mg by mouth 2 times daily as needed for Constipation   Yes Historical Provider, MD   lisinopril (PRINIVIL;ZESTRIL) 10 MG tablet Take 10 mg by mouth daily   Yes Historical Provider, MD     CURRENT SCHEDULED MEDICATIONS:   magnesium sulfate  1 g Intravenous Once    pantoprazole  40 mg Intravenous BID    And    sodium chloride (PF)  10 mL Intravenous BID    amoxicillin-clavulanate  1 tablet Oral 2 times per day    lisinopril  10 mg Oral Daily    ferrous sulfate  325 mg Oral BID WC    ipratropium-albuterol  1 ampule Inhalation Q4H    doxycycline hyclate  100 mg Oral 2 times per day    latanoprost  1 drop Right Eye Nightly    doxazosin  2 mg Oral Daily    sodium chloride flush well-nourished male in no acute distress. HEENT: Normocephalic and atraumatic. PERRL. Oropharynx unremarkable. PULM: Normal respiratory effort. No accessory muscle use. CV: RRR. ABDOMEN: Soft, nontender. EXTREMITIES: No obvious signs of vascular compromise. Pulses present. No cyanosis, clubbing or edema. SKIN: Clear; no rashes, lesions or skin breaks in exposed areas. NEURO:   MENTAL STATUS: Patient awake and oriented to time, place, and person, recent/remote memory normal, attention span/concentration normal, speech fluent without paraphasic errors, good comprehension with appropriate thought content and fund of knowledge appropriate for patient's level of education. Affect euthymic. CRANIAL NERVES:  CN I: Not tested. CN II: Fundoscopic exam deferred. CN III, IV, VI: Pupils equal, round and reactive to light. Extraocular movements full and intact. CN V: Facial sensation normal.  CN VII: Facial asymmetry absent. CN VIII: Hearing grossly normal and equal bilaterally. No skew deviation or pathologic nystagmus. CN IX, X: Palate elevates symmetrically. Speech/articulation is clear without dysarthria. CN XI: Shoulder shrug and chin rotation equal with good strength. CN XII: Tongue protrusion midline. MOTOR:   Bulk normal. Tone normal and symmetric throughout. Abnormal movements absent. Tremor: none present. Strength could not be tested. REFLEXES:  not tested. Plantar response not tested. SENSATION:   Fine touch not tested. Pain/temperature  not tested. Vibration/position  not tested. COORDINATION: Bilateral finger-to-nose ataxia. STATION: not tested. GAIT: not tested.     Labs:    Sodium   Date Value Ref Range Status   03/26/2020 139 132 - 146 mmol/L Final   03/25/2020 137 132 - 146 mmol/L Final   03/24/2020 140 132 - 146 mmol/L Final     Potassium   Date Value Ref Range Status   03/26/2020 4.0 3.5 - 5.0 mmol/L Final   03/25/2020 3.8 3.5 - 5.0 mmol/L Final   03/24/2020 3.7 3.5 - 5.0 mmol/L Final     Potassium reflex Magnesium   Date Value Ref Range Status   03/21/2020 3.5 3.5 - 5.0 mmol/L Final   03/20/2020 4.1 3.5 - 5.0 mmol/L Final     Chloride   Date Value Ref Range Status   03/26/2020 105 98 - 107 mmol/L Final   03/25/2020 103 98 - 107 mmol/L Final   03/24/2020 107 98 - 107 mmol/L Final     CO2   Date Value Ref Range Status   03/26/2020 21 (L) 22 - 29 mmol/L Final   03/25/2020 18 (L) 22 - 29 mmol/L Final   03/24/2020 19 (L) 22 - 29 mmol/L Final     BUN   Date Value Ref Range Status   03/26/2020 20 8 - 23 mg/dL Final   03/25/2020 15 8 - 23 mg/dL Final   03/24/2020 17 8 - 23 mg/dL Final     CREATININE   Date Value Ref Range Status   03/26/2020 1.1 0.7 - 1.2 mg/dL Final   03/25/2020 0.9 0.7 - 1.2 mg/dL Final   03/24/2020 1.0 0.7 - 1.2 mg/dL Final     GFR Non-   Date Value Ref Range Status   03/26/2020 >60 >=60 mL/min/1.73 Final     Comment:     Chronic Kidney Disease: less than 60 ml/min/1.73 sq.m. Kidney Failure: less than 15 ml/min/1.73 sq.m. Results valid for patients 18 years and older. 03/25/2020 >60 >=60 mL/min/1.73 Final     Comment:     Chronic Kidney Disease: less than 60 ml/min/1.73 sq.m. Kidney Failure: less than 15 ml/min/1.73 sq.m. Results valid for patients 18 years and older. 03/24/2020 >60 >=60 mL/min/1.73 Final     Comment:     Chronic Kidney Disease: less than 60 ml/min/1.73 sq.m. Kidney Failure: less than 15 ml/min/1.73 sq.m. Results valid for patients 18 years and older.        Calcium   Date Value Ref Range Status   03/26/2020 8.2 (L) 8.6 - 10.2 mg/dL Final   03/25/2020 8.0 (L) 8.6 - 10.2 mg/dL Final   03/24/2020 8.1 (L) 8.6 - 10.2 mg/dL Final     Phosphorus   Date Value Ref Range Status   03/26/2020 4.5 2.5 - 4.5 mg/dL Final   03/25/2020 4.1 2.5 - 4.5 mg/dL Final   03/21/2020 3.1 2.5 - 4.5 mg/dL Final     Magnesium   Date Value Ref Range Status   03/26/2020 1.4 (L) 1.6 - 2.6 mg/dL Final   03/25/2020 1.3 (L) 1.6 - 2.6 mg/dL Final   03/21/2020 1.6 1.6 - 2.6 mg/dL Final     WBC   Date Value Ref Range Status   03/26/2020 10.9 4.5 - 11.5 E9/L Final   03/25/2020 9.5 4.5 - 11.5 E9/L Final   03/24/2020 4.1 (L) 4.5 - 11.5 E9/L Final     Hemoglobin   Date Value Ref Range Status   03/26/2020 7.2 (L) 12.5 - 16.5 g/dL Final   03/25/2020 5.8 (L) 12.5 - 16.5 g/dL Final   03/24/2020 7.6 (L) 12.5 - 16.5 g/dL Final     Hematocrit   Date Value Ref Range Status   03/26/2020 23.3 (L) 37.0 - 54.0 % Final   03/25/2020 19.7 (L) 37.0 - 54.0 % Final   03/24/2020 25.9 (L) 37.0 - 54.0 % Final     Platelets   Date Value Ref Range Status   03/26/2020 63 (L) 130 - 450 E9/L Final   03/25/2020 63 (L) 130 - 450 E9/L Final   03/24/2020 59 (L) 130 - 450 E9/L Final     Neutrophils %   Date Value Ref Range Status   03/26/2020 62.6 43.0 - 80.0 % Final   03/25/2020 38.9 (L) 43.0 - 80.0 % Final   03/21/2020 48.7 43.0 - 80.0 % Final     Monocytes %   Date Value Ref Range Status   03/26/2020 20.9 (H) 2.0 - 12.0 % Final   03/25/2020 45.4 (H) 2.0 - 12.0 % Final   03/21/2020 36.5 (H) 2.0 - 12.0 % Final     Total Protein   Date Value Ref Range Status   03/26/2020 6.1 (L) 6.4 - 8.3 g/dL Final   03/25/2020 6.2 (L) 6.4 - 8.3 g/dL Final   03/21/2020 5.7 (L) 6.4 - 8.3 g/dL Final     Total Bilirubin   Date Value Ref Range Status   03/26/2020 0.7 0.0 - 1.2 mg/dL Final   03/25/2020 0.4 0.0 - 1.2 mg/dL Final   03/21/2020 <0.2 0.0 - 1.2 mg/dL Final     Alkaline Phosphatase   Date Value Ref Range Status   03/26/2020 52 40 - 129 U/L Final   03/25/2020 50 40 - 129 U/L Final   03/21/2020 48 40 - 129 U/L Final     ALT   Date Value Ref Range Status   03/26/2020 21 0 - 40 U/L Final   03/25/2020 24 0 - 40 U/L Final   03/21/2020 10 0 - 40 U/L Final     AST   Date Value Ref Range Status   03/26/2020 38 0 - 39 U/L Final   03/25/2020 41 (H) 0 - 39 U/L Final   03/21/2020 24 0 - 39 U/L Final     Lab Results   Component Value Date    INR 1.1 09/20/2019     No results found for: CHOLTOT, TRIG, HDL  No components found for: HGBA1C  No results found for: PROTEINCSF, GLUCCSF, WBCCSF    Imaging:  I have reviewed and interpreted the pertinent imaging and lab results. Medical Decision Making    Assesment    Seizure-like activity    Community-acquired pneumonia    Anemia        Plan    Hemoglobin was 5.8 after the episode and increased to 7.2 after transfusion of 2 units of blood. No AED for now     Etiology: Convulsive syncope versus seizure. CT head: No acute abnormality    Check MRI brain    EEG: Pending        Thank you kindly for including us in the care of this patient. Please do not hesitate to contact us with any questions. MD ANUPAMA Andres [facility] Neurology    Please note: This note was transcribed using voice recognition software. Because of this technology there are often uinintended grammatical, spelling, and other transcription errors. Please disregard these errors.

## 2021-08-03 NOTE — ED PROVIDER NOTE - PMH
Arthritis    COVID-19 vaccine series completed    HLD (hyperlipidemia)    Hypothyroid    Osteoporosis

## 2021-08-03 NOTE — H&P ADULT - PROBLEM SELECTOR PLAN 3
-Patient with history of hypothyroidism per previous charts and friend Mya although her friend states that the patient is not compliant  -Will start synthroid 50mcg daily and obtain TSH, T3 and T4

## 2021-08-03 NOTE — ED PROVIDER NOTE - OBJECTIVE STATEMENT
81 year old female p/w daughter for fall. Patient fell yesterday, uncertain of the circumstances, but states she did not pass out. Unsure if she was dizzy prior to episode. Was ambulatory after incident. Today, developed posterior headache and neck pain, as well as nausea without vomiting. She cannot recall the events from yesterday- hx provided by daughter. Patient noted to be more confused than normal. Patient currently denies visual changes, cp, sob, abd pain, or weakness.

## 2021-08-03 NOTE — H&P ADULT - PROBLEM SELECTOR PLAN 1
-Patient presenting after a mechanical fall at home-no prodromal symptoms before such as dizziness, lightheadedness, palpitations, chest pain, shortness of breath  -PT consult  -Orthostatics

## 2021-08-03 NOTE — ED PROVIDER NOTE - CLINICAL SUMMARY MEDICAL DECISION MAKING FREE TEXT BOX
Josiah Crenshaw, PGY3: 81 year old female p/w fall yesterday, unsure if near syncope prior but states no LOC. C/o headache and nausea today. Daughter notes they are homeless and kicked out of their friends house 2/2 the fall yesterday. Patient well appearing, CAOx2, NAD. Neuro intact, no scalp lacerations, FROM all extremities. Plan for syncope labs, ekg, cxr, CT head, pain control, likely admit.

## 2021-08-03 NOTE — ED PROVIDER NOTE - PHYSICAL EXAMINATION
GENERAL: A&Ox3, non-toxic appearing, no acute distress  HEENT: EOMI, oral mucosa moist, normal conjunctiva, no raccoon eyes, no banegas's sign  RESP: CTAB, no respiratory distress, no wheezes/rhonchi/rales, speaking in full sentences  CV: RRR, no murmurs/rubs/gallops  ABDOMEN: soft, non-tender, non-distended, no guarding, no rebound tenderness  MSK: no visible deformities, no midline spinal tenderness, no step offs, FROM all extremities  NEURO: no focal sensory or motor deficits, CN 2-12 grossly intact, 5/5 strength in all extremities, PERRLA  SKIN: warm, normal color, well perfused, no rash, no external bleeding, no lacerations, no abrasions, small hematoma to occiput  PSYCH: normal affect

## 2021-08-03 NOTE — H&P ADULT - PROBLEM SELECTOR PLAN 4
-SCDs for now due to head strike after fall, if no change in mental status can start pharmacoprophylaxis

## 2021-08-03 NOTE — ED ADULT TRIAGE NOTE - CHIEF COMPLAINT QUOTE
Pt had mechanical fall yesterday morning. Saw provider yesterday and was advised to come to ED. Pt has had sudden onset of forgetfulness, and HA at approx noon yest.

## 2021-08-03 NOTE — H&P ADULT - PROBLEM SELECTOR PLAN 2
-Per patient, she has memory problems  -According to her friend Mya, this is chronic and has been going on for 10-12 years and usually consists of small things like forgetting where she left her phone or what she ate  -Patient is still mostly independent  -Likely normal aging vs mild cognitive impairment, nothing acute in imaging or labs to suggest another process causing this

## 2021-08-03 NOTE — H&P ADULT - NSHPREVIEWOFSYSTEMS_GEN_ALL_CORE
REVIEW OF SYSTEMS:    CONSTITUTIONAL: No weakness, fevers or chills  EYES/ENT: No visual changes;  No vertigo or throat pain   NECK: No pain or stiffness  RESPIRATORY: No cough, wheezing, hemoptysis; No shortness of breath  CARDIOVASCULAR: No chest pain or palpitations  GASTROINTESTINAL: No abdominal or epigastric pain. No nausea, vomiting, or hematemesis; No diarrhea or constipation. No melena or hematochezia.  GENITOURINARY: No dysuria, frequency or hematuria  NEUROLOGICAL: No numbness or weakness, endorses forgetfulness and headache  SKIN: No itching, burning, rashes, or lesions  MSK: No joint pain, no back pain  HEME: No easy bleeding, no easy bruising  All other review of systems is negative unless indicated above.

## 2021-08-04 DIAGNOSIS — S09.90XS UNSPECIFIED INJURY OF HEAD, SEQUELA: ICD-10-CM

## 2021-08-04 DIAGNOSIS — E03.9 HYPOTHYROIDISM, UNSPECIFIED: ICD-10-CM
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
## 2021-08-04 LAB
ALBUMIN SERPL ELPH-MCNC: 4.1 G/DL — SIGNIFICANT CHANGE UP (ref 3.3–5)
ALP SERPL-CCNC: 80 U/L — SIGNIFICANT CHANGE UP (ref 40–120)
ALT FLD-CCNC: 18 U/L — SIGNIFICANT CHANGE UP (ref 10–45)
ANION GAP SERPL CALC-SCNC: 11 MMOL/L — SIGNIFICANT CHANGE UP (ref 5–17)
AST SERPL-CCNC: 21 U/L — SIGNIFICANT CHANGE UP (ref 10–40)
BILIRUB SERPL-MCNC: 0.3 MG/DL — SIGNIFICANT CHANGE UP (ref 0.2–1.2)
BUN SERPL-MCNC: 22 MG/DL — SIGNIFICANT CHANGE UP (ref 7–23)
CALCIUM SERPL-MCNC: 8.7 MG/DL — SIGNIFICANT CHANGE UP (ref 8.4–10.5)
CHLORIDE SERPL-SCNC: 102 MMOL/L — SIGNIFICANT CHANGE UP (ref 96–108)
CO2 SERPL-SCNC: 23 MMOL/L — SIGNIFICANT CHANGE UP (ref 22–31)
COVID-19 SPIKE DOMAIN AB INTERP: POSITIVE
COVID-19 SPIKE DOMAIN ANTIBODY RESULT: >250 U/ML — HIGH
CREAT SERPL-MCNC: 0.45 MG/DL — LOW (ref 0.5–1.3)
CULTURE RESULTS: SIGNIFICANT CHANGE UP
GLUCOSE SERPL-MCNC: 129 MG/DL — HIGH (ref 70–99)
HCT VFR BLD CALC: 38.9 % — SIGNIFICANT CHANGE UP (ref 34.5–45)
HGB BLD-MCNC: 12.4 G/DL — SIGNIFICANT CHANGE UP (ref 11.5–15.5)
MAGNESIUM SERPL-MCNC: 2.4 MG/DL — SIGNIFICANT CHANGE UP (ref 1.6–2.6)
MCHC RBC-ENTMCNC: 25.3 PG — LOW (ref 27–34)
MCHC RBC-ENTMCNC: 31.9 GM/DL — LOW (ref 32–36)
MCV RBC AUTO: 79.2 FL — LOW (ref 80–100)
NRBC # BLD: 0 /100 WBCS — SIGNIFICANT CHANGE UP (ref 0–0)
PHOSPHATE SERPL-MCNC: 3.6 MG/DL — SIGNIFICANT CHANGE UP (ref 2.5–4.5)
PLATELET # BLD AUTO: 215 K/UL — SIGNIFICANT CHANGE UP (ref 150–400)
POTASSIUM SERPL-MCNC: 3.8 MMOL/L — SIGNIFICANT CHANGE UP (ref 3.5–5.3)
POTASSIUM SERPL-SCNC: 3.8 MMOL/L — SIGNIFICANT CHANGE UP (ref 3.5–5.3)
PROT SERPL-MCNC: 6.7 G/DL — SIGNIFICANT CHANGE UP (ref 6–8.3)
RBC # BLD: 4.91 M/UL — SIGNIFICANT CHANGE UP (ref 3.8–5.2)
RBC # FLD: 16.1 % — HIGH (ref 10.3–14.5)
SARS-COV-2 IGG+IGM SERPL QL IA: >250 U/ML — HIGH
SARS-COV-2 IGG+IGM SERPL QL IA: POSITIVE
SODIUM SERPL-SCNC: 136 MMOL/L — SIGNIFICANT CHANGE UP (ref 135–145)
SPECIMEN SOURCE: SIGNIFICANT CHANGE UP
T3FREE SERPL-MCNC: 3.24 PG/ML — SIGNIFICANT CHANGE UP (ref 1.8–4.6)
T4 FREE SERPL-MCNC: 1 NG/DL — SIGNIFICANT CHANGE UP (ref 0.9–1.8)
TSH SERPL-MCNC: 14.3 UIU/ML — HIGH (ref 0.27–4.2)
WBC # BLD: 6.25 K/UL — SIGNIFICANT CHANGE UP (ref 3.8–10.5)
WBC # FLD AUTO: 6.25 K/UL — SIGNIFICANT CHANGE UP (ref 3.8–10.5)

## 2021-08-04 PROCEDURE — 93306 TTE W/DOPPLER COMPLETE: CPT | Mod: 26

## 2021-08-04 RX ORDER — HEPARIN SODIUM 5000 [USP'U]/ML
5000 INJECTION INTRAVENOUS; SUBCUTANEOUS EVERY 12 HOURS
Refills: 0 | Status: DISCONTINUED | OUTPATIENT
Start: 2021-08-04 | End: 2021-08-10

## 2021-08-04 RX ADMIN — Medication 50 MICROGRAM(S): at 05:26

## 2021-08-04 RX ADMIN — HEPARIN SODIUM 5000 UNIT(S): 5000 INJECTION INTRAVENOUS; SUBCUTANEOUS at 18:56

## 2021-08-04 NOTE — PHYSICAL THERAPY INITIAL EVALUATION ADULT - TRANSFER TRAINING, PT EVAL
GOAL: Patient will transfer between sitting/standing independently with use of rolling walker within 2 weeks.

## 2021-08-04 NOTE — PHYSICAL THERAPY INITIAL EVALUATION ADULT - ADDITIONAL COMMENTS
Patient reports that she lives alone, lives in an apt with no steps to enter. Independent ambulator without use of AD prior to admission. Independent with ADLs.

## 2021-08-04 NOTE — CONSULT NOTE ADULT - ASSESSMENT
Niya Deleon is an 81 year old female, Citizen of the [place]-speaking, with PMH hypothyroidism who presents after a fall. The patient's neuro exam is significant for orientation to self, place, and month but not date or year. The exam is otherwise unremarkable. CT Head negative.    Impression: The patient's fall is likely mechanical in nature. She denies prodromal symptoms, LOC, or seizure-like activity associated with the fall. No numbness or weakness noted on exam.     Recommendations  1. Pre-syncope work-up as per primary team and cardiology  2. Optimization of electrolytes  3. Outpatient follow-up for neurocognitive impairment Niya Deleon is an 81 year old female, [place]-speaking, with PMH hypothyroidism who presents after a fall. The patient's neuro exam is significant for orientation to self, place, and month but not date or year. The exam is otherwise unremarkable. CT Head negative.    Impression: The patient's fall is likely mechanical in nature. She denies prodromal symptoms, LOC, or seizure-like activity associated with the fall. No numbness or weakness noted on exam.     Recommendations  [] Pre-syncope work-up as per primary team and cardiology  [] Optimization of electrolytes  [] check B12, folate  [] Outpatient follow-up for neurocognitive evaluation with Dr. Hernandez or Dr. Rojas at [address] [place] [address]. ([phone])    Case to be discussed and patient seen with neurology attending, Dr. Barragan.

## 2021-08-04 NOTE — CONSULT NOTE ADULT - ASSESSMENT
81 year old female with PMH hypothyroidism who presents after a fall. Patient very forgetful-attempted to reach daughter for more history but unable to so history obtained from chart as well as friend Almavaibhav ([phone]). The patient had a mechanical fall on Monday-she denies any prodromal symptoms such as dizziness, lightheadedness, chest pain, shortness of breath or palpitations. She developed a headache and was told to go to the ER by her PCP and was brought in by her daughter. The patient complains of forgetfulness. When discussed with Mya, it has been going on for 10-12 years and only includes minor issues like forgetting where she put her phone or what she ate but the patient remains mostly independent. Additionally, Mya believes that the patient is not complaint with her thyroid medication.  Upon arrival, vital signs were /79, HR 66, RR 18, temperature 98 degrees Farenheit and saturating 99% on room air. Labs and imaging were without acute findings and she was admitted for further workup and management of her fall.  pt with hx ?dementia with s/p multiple falls ?syncope  r/o orthostatic hypotension  check orthostatic bp/ hr  tsh  tele so far no arrythmia  b12 level  physical therapy  echo  thank you

## 2021-08-04 NOTE — PHYSICAL THERAPY INITIAL EVALUATION ADULT - PERTINENT HX OF CURRENT PROBLEM, REHAB EVAL
81F  PMH hypothyroidism who presents after a fall. She denies any prodromal symptoms such as dizziness, lightheadedness, chest pain, shortness of breath or palpitations. She developed a headache and was told to go to the ER by her PCP and was brought in by her daughter. The patient complains of forgetfulness. CT head no masses or bleeding.

## 2021-08-05 ENCOUNTER — TRANSCRIPTION ENCOUNTER (OUTPATIENT)
Age: 81
End: 2021-08-05

## 2021-08-05 LAB
CHOLEST SERPL-MCNC: 232 MG/DL — HIGH
FOLATE RBC-MCNC: 1271 NG/ML — SIGNIFICANT CHANGE UP (ref 499–1504)
HCT VFR BLD CALC: 38 % — SIGNIFICANT CHANGE UP (ref 34.5–45)
HDLC SERPL-MCNC: 63 MG/DL — SIGNIFICANT CHANGE UP
LIPID PNL WITH DIRECT LDL SERPL: 154 MG/DL — HIGH
NON HDL CHOLESTEROL: 170 MG/DL — HIGH
T PALLIDUM AB TITR SER: NEGATIVE — SIGNIFICANT CHANGE UP
TRIGL SERPL-MCNC: 76 MG/DL — SIGNIFICANT CHANGE UP
TSH SERPL-MCNC: 12.2 UIU/ML — HIGH (ref 0.27–4.2)
VIT B12 SERPL-MCNC: 598 PG/ML — SIGNIFICANT CHANGE UP (ref 232–1245)

## 2021-08-05 RX ORDER — ATORVASTATIN CALCIUM 80 MG/1
20 TABLET, FILM COATED ORAL AT BEDTIME
Refills: 0 | Status: DISCONTINUED | OUTPATIENT
Start: 2021-08-05 | End: 2021-08-10

## 2021-08-05 RX ADMIN — Medication 50 MICROGRAM(S): at 05:48

## 2021-08-05 RX ADMIN — HEPARIN SODIUM 5000 UNIT(S): 5000 INJECTION INTRAVENOUS; SUBCUTANEOUS at 19:16

## 2021-08-05 RX ADMIN — ATORVASTATIN CALCIUM 20 MILLIGRAM(S): 80 TABLET, FILM COATED ORAL at 21:35

## 2021-08-05 RX ADMIN — HEPARIN SODIUM 5000 UNIT(S): 5000 INJECTION INTRAVENOUS; SUBCUTANEOUS at 05:47

## 2021-08-05 NOTE — DIETITIAN INITIAL EVALUATION ADULT. - ADD RECOMMEND
3) Malnutrition alert placed. 4) Reinforce nutrition education as able. 5) Continue to trend labs, weight, skin integrity, and intake. 3) Malnutrition alert placed. 4) Reinforce nutrition education as able. 5) Continue to trend labs, weight, skin integrity, and intake. 6) As medically feasible, consider addition of multivitamin.

## 2021-08-05 NOTE — DIETITIAN INITIAL EVALUATION ADULT. - PERTINENT MEDS FT
occupational therapy
atorvastatin 20 milliGRAM(s) Oral at bedtime  heparin   Injectable 5000 Unit(s) SubCutaneous every 12 hours  levothyroxine 50 MICROGram(s) Oral daily

## 2021-08-05 NOTE — DISCHARGE NOTE PROVIDER - CARE PROVIDER_API CALL
Antony Wise)  Medicine  [address] [place] [address], [address]  [place] [address]  Phone: [phone]  Fax: [phone]  Follow Up Time: 1 week

## 2021-08-05 NOTE — DISCHARGE NOTE PROVIDER - DETAILS OF MALNUTRITION DIAGNOSIS/DIAGNOSES
This patient has been assessed with a concern for Malnutrition and was treated during this hospitalization for the following Nutrition diagnosis/diagnoses:     -  08/05/2021: Severe protein-calorie malnutrition

## 2021-08-05 NOTE — DISCHARGE NOTE PROVIDER - NSDCCPCAREPLAN_GEN_ALL_CORE_FT
PRINCIPAL DISCHARGE DIAGNOSIS  Diagnosis: Fall  Assessment and Plan of Treatment: WHAT YOU NEED TO KNOW:  Fall prevention includes ways to make your home and other areas safer. It also includes ways you can move more carefully to prevent a fall. Health conditions that cause changes in your blood pressure, vision, or muscle strength and coordination may increase your risk for falls. Medicines may also increase your risk for falls if they make you dizzy, weak, or sleepy.  DISCHARGE INSTRUCTIONS:  Call 911 or have someone else call if:  •You have fallen and are unconscious.  •You have fallen and cannot move part of your body.  Contact your healthcare provider if:  •You have fallen and have pain or a headache.  •You have questions or concerns about your condition or care.  Fall prevention tips:  •Stand or sit up slowly. This may help you keep your balance and prevent falls.  •Use assistive devices as directed. Your healthcare provider may suggest that you use a cane or walker to help you keep your balance. You may need to have grab bars put in your bathroom near the toilet or in the shower.  •Wear shoes that fit well and have soles that . Wear shoes both inside and outside. Use slippers with good . Do not wear shoes with high heels.  •Wear a personal alarm. This is a device that allows you to call 911 if you fall and need help. Ask your healthcare provider for more information.  •Stay active. Exercise can help strengthen your muscles and improve your balance. Your healthcare provider may recommend water aerobics or walking. He or she may also recommend physical therapy to improve your coordination. Never start an exercise program without talking to your healthcare provider first.  •Manage your medical conditions. Keep all appointments with your healthcare providers. Visit your eye doctor as directed.        SECONDARY DISCHARGE DIAGNOSES  Diagnosis: Hypothyroidism  Assessment and Plan of Treatment: you do not make enough thyroid hormone  signs & symptoms of low levels of thyroid hormone - tired, getting cold easily, coarse or thin hair, constipation, shortness of breath, swelling, irregular periods  your doctor will do thyroid hormone blood tests at least once a year to monitor if medication dose is adequate  take your thyroid medicine as directed by your doctor & on empty stomach      Diagnosis: Forgetfulness  Assessment and Plan of Treatment: You were seen and evaluated by Neurologist.  CT head negative. Infectious workup negative. Nothing acute in imaging or labs to suggest another process causing this. Likely normal aging vs. mild cognitive impairment.

## 2021-08-05 NOTE — DIETITIAN INITIAL EVALUATION ADULT. - ORAL INTAKE PTA/DIET HISTORY
Noted as forgetful, however answered questions appropriately. Pt with some decrease in intake over past 3 months of unknown etiology. Pt was not following therapeutic diet; reports woman at apartment building prepares meals for pt. Confirms no known food allergies. Denies Hx of chewing or swallowing issues. Denies micronutrient use. Pt took Ensure 1-2 daily.

## 2021-08-05 NOTE — DIETITIAN INITIAL EVALUATION ADULT. - OTHER INFO
Dosing wt: 119.9 lbs. Daily wt in lbs - standin.1 (), 109.7 (). Reports UBW of 120 lbs, endorses 10 lb unintentional wt loss x3 months 2/2 reduced PO intake. RD will continue to trend as new wts available/able.     Pt is eating well (100% meals; typically) with no changes in appetite. Denies recent N/V, diarrhea, or constipation. Last BM .    Discussed need for nutrition supplement and increased protein-energy needs with pt. Amenable to receiving Ensure Enlive during admission. Pt made aware RD to remain available.

## 2021-08-05 NOTE — DIETITIAN INITIAL EVALUATION ADULT. - PHYSCIAL ASSESSMENT
underweight IBW%:   Skin per nursing documentation: No pressure injuries noted.  BMI based on daily wt 49.8 kG (8/4 - lowest wt thus far)

## 2021-08-05 NOTE — DISCHARGE NOTE PROVIDER - HOSPITAL COURSE
81 year old female with PMH hypothyroidism who presents after a fall without prodromal symptoms before. Patient seen and evaluated by Cards and Neuro. Trop neg x 2, orthostatic neg, CTH neg, unremarkable Echo. Forgetfulness also addressed with Neuro. Likely normal aging vs. mild cognitive impairment, nothing acute in imaging or labs to suggest another process causing this. UA neg, CXR clear lungs. RPR neg. Patient seen by PT and SW for safe discharge. Patient remains stable for DC with close follow up with PCP as per Dr. Wise.

## 2021-08-05 NOTE — DISCHARGE NOTE PROVIDER - NSDCFUADDAPPT_GEN_ALL_CORE_FT
You will be discharged to rehab. Please continue to work with physical therapy to re-gain strength. Please follow up with your PCP, Dr. Wise in a week after being discharged from rehab for further management   Please follow up with your PCP in a week for further workup and management

## 2021-08-05 NOTE — DISCHARGE NOTE PROVIDER - NSDCMRMEDTOKEN_GEN_ALL_CORE_FT
levothyroxine 50 mcg (0.05 mg) oral tablet: 1 tab(s) orally once a day   aspirin 81 mg oral delayed release tablet: 1 tab(s) orally once a day  atorvastatin 20 mg oral tablet: 1 tab(s) orally once a day (at bedtime)  levothyroxine 50 mcg (0.05 mg) oral tablet: 1 tab(s) orally once a day

## 2021-08-06 RX ADMIN — HEPARIN SODIUM 5000 UNIT(S): 5000 INJECTION INTRAVENOUS; SUBCUTANEOUS at 17:34

## 2021-08-06 RX ADMIN — Medication 50 MICROGRAM(S): at 06:15

## 2021-08-06 RX ADMIN — ATORVASTATIN CALCIUM 20 MILLIGRAM(S): 80 TABLET, FILM COATED ORAL at 21:06

## 2021-08-06 RX ADMIN — HEPARIN SODIUM 5000 UNIT(S): 5000 INJECTION INTRAVENOUS; SUBCUTANEOUS at 06:16

## 2021-08-07 LAB
ANION GAP SERPL CALC-SCNC: 13 MMOL/L — SIGNIFICANT CHANGE UP (ref 5–17)
BUN SERPL-MCNC: 17 MG/DL — SIGNIFICANT CHANGE UP (ref 7–23)
CALCIUM SERPL-MCNC: 9 MG/DL — SIGNIFICANT CHANGE UP (ref 8.4–10.5)
CHLORIDE SERPL-SCNC: 101 MMOL/L — SIGNIFICANT CHANGE UP (ref 96–108)
CO2 SERPL-SCNC: 23 MMOL/L — SIGNIFICANT CHANGE UP (ref 22–31)
CREAT SERPL-MCNC: 0.52 MG/DL — SIGNIFICANT CHANGE UP (ref 0.5–1.3)
GLUCOSE SERPL-MCNC: 115 MG/DL — HIGH (ref 70–99)
POTASSIUM SERPL-MCNC: 4.2 MMOL/L — SIGNIFICANT CHANGE UP (ref 3.5–5.3)
POTASSIUM SERPL-SCNC: 4.2 MMOL/L — SIGNIFICANT CHANGE UP (ref 3.5–5.3)
SODIUM SERPL-SCNC: 137 MMOL/L — SIGNIFICANT CHANGE UP (ref 135–145)
T3 SERPL-MCNC: 108 NG/DL — SIGNIFICANT CHANGE UP (ref 80–200)
T4 AB SER-ACNC: 7.7 UG/DL — SIGNIFICANT CHANGE UP (ref 4.6–12)
TSH SERPL-MCNC: 13 UIU/ML — HIGH (ref 0.27–4.2)

## 2021-08-07 RX ADMIN — HEPARIN SODIUM 5000 UNIT(S): 5000 INJECTION INTRAVENOUS; SUBCUTANEOUS at 05:46

## 2021-08-07 RX ADMIN — HEPARIN SODIUM 5000 UNIT(S): 5000 INJECTION INTRAVENOUS; SUBCUTANEOUS at 17:22

## 2021-08-07 RX ADMIN — Medication 50 MICROGRAM(S): at 05:46

## 2021-08-07 RX ADMIN — ATORVASTATIN CALCIUM 20 MILLIGRAM(S): 80 TABLET, FILM COATED ORAL at 21:19

## 2021-08-08 LAB — SARS-COV-2 RNA SPEC QL NAA+PROBE: SIGNIFICANT CHANGE UP

## 2021-08-08 RX ADMIN — ATORVASTATIN CALCIUM 20 MILLIGRAM(S): 80 TABLET, FILM COATED ORAL at 21:33

## 2021-08-08 RX ADMIN — HEPARIN SODIUM 5000 UNIT(S): 5000 INJECTION INTRAVENOUS; SUBCUTANEOUS at 17:07

## 2021-08-08 RX ADMIN — Medication 50 MICROGRAM(S): at 05:23

## 2021-08-09 RX ORDER — ASPIRIN/CALCIUM CARB/MAGNESIUM 324 MG
1 TABLET ORAL
Qty: 0 | Refills: 0 | DISCHARGE
Start: 2021-08-09

## 2021-08-09 RX ORDER — ASPIRIN/CALCIUM CARB/MAGNESIUM 324 MG
81 TABLET ORAL DAILY
Refills: 0 | Status: DISCONTINUED | OUTPATIENT
Start: 2021-08-09 | End: 2021-08-10

## 2021-08-09 RX ORDER — ATORVASTATIN CALCIUM 80 MG/1
1 TABLET, FILM COATED ORAL
Qty: 0 | Refills: 0 | DISCHARGE
Start: 2021-08-09

## 2021-08-09 RX ADMIN — HEPARIN SODIUM 5000 UNIT(S): 5000 INJECTION INTRAVENOUS; SUBCUTANEOUS at 17:06

## 2021-08-09 RX ADMIN — Medication 50 MICROGRAM(S): at 05:28

## 2021-08-09 RX ADMIN — ATORVASTATIN CALCIUM 20 MILLIGRAM(S): 80 TABLET, FILM COATED ORAL at 21:21

## 2021-08-09 RX ADMIN — HEPARIN SODIUM 5000 UNIT(S): 5000 INJECTION INTRAVENOUS; SUBCUTANEOUS at 05:27

## 2021-08-09 RX ADMIN — Medication 81 MILLIGRAM(S): at 12:35

## 2021-08-10 ENCOUNTER — TRANSCRIPTION ENCOUNTER (OUTPATIENT)
Age: 81
End: 2021-08-10

## 2021-08-10 VITALS
RESPIRATION RATE: 17 BRPM | OXYGEN SATURATION: 98 % | SYSTOLIC BLOOD PRESSURE: 114 MMHG | DIASTOLIC BLOOD PRESSURE: 69 MMHG | TEMPERATURE: 98 F | HEART RATE: 82 BPM

## 2021-08-10 PROCEDURE — 72125 CT NECK SPINE W/O DYE: CPT

## 2021-08-10 PROCEDURE — 82330 ASSAY OF CALCIUM: CPT

## 2021-08-10 PROCEDURE — 85018 HEMOGLOBIN: CPT

## 2021-08-10 PROCEDURE — U0003: CPT

## 2021-08-10 PROCEDURE — 85014 HEMATOCRIT: CPT

## 2021-08-10 PROCEDURE — 99285 EMERGENCY DEPT VISIT HI MDM: CPT

## 2021-08-10 PROCEDURE — 84439 ASSAY OF FREE THYROXINE: CPT

## 2021-08-10 PROCEDURE — 82607 VITAMIN B-12: CPT

## 2021-08-10 PROCEDURE — 97530 THERAPEUTIC ACTIVITIES: CPT

## 2021-08-10 PROCEDURE — 86769 SARS-COV-2 COVID-19 ANTIBODY: CPT

## 2021-08-10 PROCEDURE — 93306 TTE W/DOPPLER COMPLETE: CPT

## 2021-08-10 PROCEDURE — 80053 COMPREHEN METABOLIC PANEL: CPT

## 2021-08-10 PROCEDURE — 82803 BLOOD GASES ANY COMBINATION: CPT

## 2021-08-10 PROCEDURE — 85027 COMPLETE CBC AUTOMATED: CPT

## 2021-08-10 PROCEDURE — 84100 ASSAY OF PHOSPHORUS: CPT

## 2021-08-10 PROCEDURE — 82747 ASSAY OF FOLIC ACID RBC: CPT

## 2021-08-10 PROCEDURE — U0005: CPT

## 2021-08-10 PROCEDURE — 84481 FREE ASSAY (FT-3): CPT

## 2021-08-10 PROCEDURE — 81001 URINALYSIS AUTO W/SCOPE: CPT

## 2021-08-10 PROCEDURE — 97116 GAIT TRAINING THERAPY: CPT

## 2021-08-10 PROCEDURE — 83605 ASSAY OF LACTIC ACID: CPT

## 2021-08-10 PROCEDURE — 84480 ASSAY TRIIODOTHYRONINE (T3): CPT

## 2021-08-10 PROCEDURE — 84295 ASSAY OF SERUM SODIUM: CPT

## 2021-08-10 PROCEDURE — 80061 LIPID PANEL: CPT

## 2021-08-10 PROCEDURE — 71045 X-RAY EXAM CHEST 1 VIEW: CPT

## 2021-08-10 PROCEDURE — 85025 COMPLETE CBC W/AUTO DIFF WBC: CPT

## 2021-08-10 PROCEDURE — 84132 ASSAY OF SERUM POTASSIUM: CPT

## 2021-08-10 PROCEDURE — 97161 PT EVAL LOW COMPLEX 20 MIN: CPT

## 2021-08-10 PROCEDURE — 83880 ASSAY OF NATRIURETIC PEPTIDE: CPT

## 2021-08-10 PROCEDURE — 84484 ASSAY OF TROPONIN QUANT: CPT

## 2021-08-10 PROCEDURE — 84443 ASSAY THYROID STIM HORMONE: CPT

## 2021-08-10 PROCEDURE — 83735 ASSAY OF MAGNESIUM: CPT

## 2021-08-10 PROCEDURE — 82435 ASSAY OF BLOOD CHLORIDE: CPT

## 2021-08-10 PROCEDURE — 70450 CT HEAD/BRAIN W/O DYE: CPT

## 2021-08-10 PROCEDURE — 82947 ASSAY GLUCOSE BLOOD QUANT: CPT

## 2021-08-10 PROCEDURE — 86780 TREPONEMA PALLIDUM: CPT

## 2021-08-10 PROCEDURE — 84436 ASSAY OF TOTAL THYROXINE: CPT

## 2021-08-10 PROCEDURE — 87086 URINE CULTURE/COLONY COUNT: CPT

## 2021-08-10 PROCEDURE — 80048 BASIC METABOLIC PNL TOTAL CA: CPT

## 2021-08-10 RX ORDER — LEVOTHYROXINE SODIUM 125 MCG
1 TABLET ORAL
Qty: 30 | Refills: 0
Start: 2021-08-10 | End: 2021-09-08

## 2021-08-10 RX ORDER — ATORVASTATIN CALCIUM 80 MG/1
1 TABLET, FILM COATED ORAL
Qty: 30 | Refills: 0
Start: 2021-08-10 | End: 2021-09-08

## 2021-08-10 RX ORDER — ASPIRIN/CALCIUM CARB/MAGNESIUM 324 MG
1 TABLET ORAL
Qty: 30 | Refills: 0
Start: 2021-08-10 | End: 2021-09-08

## 2021-08-10 RX ADMIN — Medication 50 MICROGRAM(S): at 05:34

## 2021-08-10 RX ADMIN — Medication 81 MILLIGRAM(S): at 12:42

## 2021-08-10 RX ADMIN — HEPARIN SODIUM 5000 UNIT(S): 5000 INJECTION INTRAVENOUS; SUBCUTANEOUS at 05:34

## 2021-08-10 NOTE — CONSULT NOTE ADULT - ASSESSMENT
Recommendation: The patient has capacity to choose her health care proxy, but does not have capacity for more complex decisions. Therefore, each decision should be reassessed for capacity for that specific decision. Also, patient and daughter were both amenable to discharge to subacute rehab so this can be facilitated.  ETHICS SERVICE WILL REMAIN AVAILABLE FOR FURTHER CONVERSATION ABOUT THE PATIENT’S CURRENT CONDITION AND DECISION-POINTS THAT MAY OCCUR.     CASE DISCUSSED with ATTENDING: Jennifer Lubin MS, MD, MPH, [facility].  More than 50% of the time of this consultation was spent in coordination of Care of Patient.  REFERENCES  (i) Bruce TL & Zulema JF. Principles of Biomedical Ethics. [place]: [employer]; 2019.    (ii) Appelbaum PS. Assessment of patients’ competence to consent to treatment. NEJM. 2007; 357:2409-0562.    (iii) https://www.health.[place].gov/publications/1430.pdf   (iv) Baron EK, Rachell H, et al. Surviving Surrogate Decision-Making: What Helps and Hampers the Experience of Making Medical Decisions for Others. Soc Gen Int Med 2007;22:9376-0729.

## 2021-08-10 NOTE — DISCHARGE NOTE NURSING/CASE MANAGEMENT/SOCIAL WORK - PATIENT PORTAL LINK FT
You can access the FollowMyHealth Patient Portal offered by [facility] by registering at the following website: [email or web address]. By joining [employer]’s FollowMyHealth portal, you will also be able to view your health information using other applications (apps) compatible with our system.

## 2021-08-10 NOTE — PROGRESS NOTE ADULT - NUTRITIONAL ASSESSMENT
This patient has been assessed with a concern for Malnutrition and has been determined to have a diagnosis/diagnoses of Severe protein-calorie malnutrition.    This patient is being managed with:   Diet Regular-  Supplement Feeding Modality:  Oral  Ensure Enlive Cans or Servings Per Day:  2       Frequency:  Daily  Entered: Aug  5 2021  5:06PM    

## 2021-08-10 NOTE — CHART NOTE - NSCHARTNOTEFT_GEN_A_CORE
Nutrition Follow Up Note  Patient seen for: initial malnutrition follow-up    Chart reviewed, events noted.     Source: [x] Patient  --pt able to communicate basic needs in english, declining      [x] EMR        [] RN        [] Family at bedside       [] Other:    Diet Order:   Diet, Regular:   Supplement Feeding Modality:  Oral  Ensure Enlive Cans or Servings Per Day:  2       Frequency:  Daily (21)    - Is current order appropriate/adequate? [x] Yes  []  No:     - PO intake :   [x] >75%  Adequate    [] 50-75%  Fair       [] <50%  Poor    - Nutrition-related concerns: pt reports good PO intake and appetite, denies any acute GI distress. per nursing flow sheets pt consuming 100% of meals. Drinking Ensure supplements. Patient with no nutrition-related questions at this time. Made aware RD remains available as needed.     GI:  Last BM ___.   Bowel Regimen? [] Yes   [x] No      Weights:   Daily Weight in k.8 (), Weight in k.4 (), Weight in k.8 ()    Nutritionally Pertinent MEDICATIONS  (STANDING):  atorvastatin  levothyroxine    Pertinent Labs: reviewed       Skin per nursing documentation: free of pressure injuries per nursing flow sheets   Edema: none     Estimated Needs:   [x] no change since previous assessment  [] recalculated:     Previous Nutrition Diagnosis: Malnutrition Severe chronic malnutrition.   Nutrition Diagnosis is: [x] ongoing  [] resolved [] not applicable -being addressed with nutrition supplements     New Nutrition Diagnosis: [x] Not applicable    Nutrition Care Plan:  [x] In Progress  [] Achieved  [] Not applicable    Nutrition Interventions:     Education Provided:       [] Yes:  [x] No:        Recommendations:         [x] Continue current diet order with Ensure Enlive supplements      [x] RD to remain available and follow-up as medically appropriate.       Monitoring and Evaluation:   Continue to monitor nutritional intake, tolerance to diet prescription, weights, labs, skin integrity      RD remains available upon request and will follow up per protocol  Alessia Jesus RD, CDN, Pager # [phone]
Met with patient at bedside with  and  (Star Shi ID# [id number])  Patient has capacity to decide who she wants as her health care proxy, but does not have capacity for other complex decisions at this time. Each decision should be assessed individually. This was discussed with Ethics Attending, Jennifer Lubin, and full consult note to follow.

## 2021-08-10 NOTE — CONSULT NOTE ADULT - SUBJECTIVE AND OBJECTIVE BOX
**LATE ENTRY**  Consult requested by: Loren Williamson   Role:    Service: Medicine         Primary MD: Antony Wise, Medicine Attending.  Consultant: Adina Schwab, RPA-C (Ethics Fellow) [phone] or (O) [phone]    Consult purpose:  Assist the team in the ethical dilemma posed by an 81 year old female with questionable capacity, regarding establishing a health care proxy.     Clinical summary: This is an 81 year old female with PMH of hypothyroidism, hyperlipidemia, osteoporosis and confusion, who presented to the hospital after a fall. Neurology and cardiology consulted and workup without acute findings for fall or confusion. Fall likely mechanical in nature and confusion likely aging vs. dementia. Patient is homeless and was living by a friend prior to hospitalization. She has an adult daughter who is currently here (from [facility]). Per the , patient is confused however, she noticed a health care proxy form was completed in the chart by the daughter over the weekend.    Ethics team consulted to assist with decision making capacity for Health Care Proxy.    Prognosis Estimate (survival in hrs, days, wks, mos, yrs): potentially years  Patient Decision-Making Capacity:  Has capacity –to determine HCP, but lacks capacity for other more complex decisions  Lacks capacity  Patient Aware of:  Diagnosis:   Yes    No   Unknown    Prognosis:   Yes    No   Unknown       Name of medical decision-maker should patient lack capacity: Daughter, Quiana, as of this admission  Other stakeholders: 3 other children however, they are in [facility] and not involved in her care.   Other Decision-Maker (i.e., HCA or Surrogate) Aware of:  Diagnosis: Yes   No      Prognosis:   Yes     No   Evidence of Patient’s Preference of Life-Sustaining Treatment (Written or Oral): none  Resuscitation status:   DNR:  Yes   No      DNI: Yes   No       Discussion with Jennifer Lubin (Ethicist), Adina Schwab (Ethics Fellow) and Loren Williamson () on 8/9/21: Discussed concern that HCP was completed while patient lacks capacity.  Discussion with Adina Schwab (Ethics Fellow), Loren Williamson (), and patient with Afghan  Star Shi (ID# [id number]) on 8/10/21: Patient had poor insight into her medical condition and could not tell us why she came to the hospital or what was done while she is here, but she did say she was forgetful and that she has a problem with her thyroid and is taking medicine for it. She was also able to tell us she has 4 children one of which is here and involved in her care. The 3 others are in [facility] and not involved in her care. When asked if she wants them to be involved in her care as well, or if we should contact them, she declined saying she doesn’t want us to call them as they do not know what is going on, they are busy working, and she has her daughter here who is helping her. She expressed she trusts her daughter and is comfortable with her being involved in her care and making decisions for her if she cannot. I showed her the health care proxy form and asked her if she recognized it, she did not remember the form or signing it, but did point to her signature and said it was her signature, she also read the address and said the address was correct (where she was prior to hospitalization but said it is not the current address as she will not be going back there). When I explained the form and that it states her daughter can make medical decisions for her if she cannot, she was ok with that and expressed she wants her daughter involved in her care.  Discussion with Adina Schwab (Ethics Fellow), Loren Williamson (), and Quiana (daughter) in patient’s presence on 8/10/21: Quiana is aware she is the health care proxy. We also discussed discharge to subacute rehab which she was amenable to. When asked why she previously refused it she said she thought it was a long term care facility in which her mom would not be able to come out and no family can visit her. When she was informed this is a short term rehab for reconditioning, and that family can visit (as per their COVID policy), but there is an option for long term care if they choose, she was ok with the patient going to rehab.    Bioethics analysis: THE CENTRAL ETHICAL ISSUE PRESENTED IN THIS CASE IS RESPECTING PATIENT AUTONOMY.  The principle of respect for autonomous persons acknowledges a patient’s right to hold views, make choices, and take actions based on their values and beliefs(i). Furthermore, this principle recognizes the patient’s dignity and bodily integrity(i). Essential to this principle is the capacity for autonomous choice(i). In other words, the patient’s ability to decide what can and cannot be done to him according to his set of values. However, to preserve and empower this capacity to make choices, we must assess if the patient can do so. If not, we must find the appropriate janet of the patient’s autonomy.   Capacity is assessed using, among other tests, the CUAR test(ii). It requires the patient to (1) be able to communicate a choice (communication); (2) comprehend the facts, management, and alternatives about his condition (understanding); (3) acknowledge his medical condition and likely consequences of treatment options (appreciation); and (4) engage in a rational process of manipulating the relevant information (reasoning). If a patient lacks any of these four criteria, we should find the appropriate janet of the patient’s autonomy. That way, we preserve the respect for the autonomous patient and protect him from the consequences of a decision resulting in harm. Of note, capacity is decision specific, and it can wax and wane over time depending on factors such as mood, medical condition, nutritional status, and delirium. Also, practitioners should continuously optimize a patient’s capacity, to the extent possible return them to a baseline capacity, to help them make medical decisions for themselves. They should be allowed to make their own health care decisions if they can do so.   In this case, Ms. Deleon does not have capacity to make complex medical decisions due to her confusion however, she is capable of deciding who she would like to be her health care proxy, and was clear she wanted her daughter who is here to be her decision maker if she cannot.  The Health Care Proxy Law in [facility] lets a patient appoint a competent adult to make decisions about their medical treatment in the event they lose the ability to decide for themself – including decisions to remove or provide life-sustaining treatment (iii). However, as long as they have capacity they can make their own decisions.  Communication is central in the practitioner-patient relationship(iv) This is not only owed to patients. It also extends to a patient’s health care proxy when a patient loses capacity. Sometimes patients and health care proxies can create several concerns for the medical team. However, patients and health care proxies are sometimes thrown to a difficult situation and can bring their fears and anxieties to an already delicate situation. Understanding the root of the problem can help diffuse these difficult moments. Practical tools such as extensive communication with the patient and health care proxy and active listening can help in this regard.  
HPI:  81 year old female, Persian-speaking, with PMH hypothyroidism who presents after a fall. Patient very forgetful-attempted to reach daughter for more history but unable to so history obtained from chart as well as friend Mya ([phone]). The patient had a mechanical fall on Monday-she denies any prodromal symptoms such as dizziness, lightheadedness, chest pain, shortness of breath or palpitations. She developed a headache and was told to go to the ER by her PCP and was brought in by her daughter. The patient complains of forgetfulness. When discussed with Mya, it has been going on for 10-12 years and only includes minor issues like forgetting where she put her phone or what she ate but the patient remains mostly independent. Additionally, Mya believes that the patient is not complaint with her thyroid medication.  Upon arrival, vital signs were /79, HR 66, RR 18, temperature 98 degrees Farenheit and saturating 99% on room air. Labs and imaging were without acute findings and she was admitted for further workup and management of her fall    The patient is primarily Persian-speaking and was interviewed using [employer] #[id number]. The patient reports walking in her home 2 days ago when she tripped and fell. She reports that she trips often, but this is the first time she has fallen because of it. She reports hitting her head during the fall and experiencing a headache afterwards. She denies LOC. She also denies any prodromal symptoms including CP, SOB, palpitations, dizziness, lightheadedness, weakness, visual disturbance, or aura. She denies any numbness/weakness, urinary incontinence, tongue biting, or confusion. After the fall, she slowly picked herself up and called her daughter. The patient mentions that she has had trouble with tripping for years now and was previously in frequent PT, which resolved the issue. She has not done PT for over a year now and has noticed an increase in tripping during that time. She would like to restart PT. Notably, the patient also reports trouble with memory for the past year (per chart review, pt's friend states >10 years). She is oriented to self, place, and month but not year and date.     REVIEW OF SYSTEMS  CONSTITUTIONAL: No weakness, fevers or chills  EYES/ENT: No visual changes;  No vertigo or throat pain   NECK: No pain or stiffness  RESPIRATORY: No cough, wheezing, hemoptysis; No shortness of breath  CARDIOVASCULAR: No chest pain or palpitations  GASTROINTESTINAL: No abdominal or epigastric pain. No nausea, vomiting, or hematemesis; No diarrhea or constipation. No melena or hematochezia.  GENITOURINARY: No dysuria, frequency or hematuria  NEUROLOGICAL: No numbness or weakness  SKIN: No itching, rashes  A 10-system ROS was performed and is negative except for those items noted above and/or in the HPI.    PAST MEDICAL & SURGICAL HISTORY:  HLD (hyperlipidemia)  Osteoporosis  Arthritis  Hypothyroid  COVID-19 vaccine series completed  S/P hysterectomy    FAMILY HISTORY:  FH: diabetes mellitus (Mother)    SOCIAL HISTORY:   T/E/D: None  Lives with: friend    MEDICATIONS (HOME):  Home Medications:  levothyroxine 50 mcg (0.05 mg) oral tablet: 1 tab(s) orally once a day (14 Oct 2014 10:05)    MEDICATIONS  (STANDING):  heparin   Injectable 5000 Unit(s) SubCutaneous every 12 hours  levothyroxine 50 MICROGram(s) Oral daily    MEDICATIONS  (PRN):  None    ALLERGIES/INTOLERANCES:  Allergies  No Known Allergies    VITALS & EXAMINATION:  Vital Signs Last 24 Hrs  T(C): 36.5 (04 Aug 2021 05:18), Max: 36.7 (03 Aug 2021 16:20)  T(F): 97.7 (04 Aug 2021 05:18), Max: 98 (03 Aug 2021 16:20)  HR: 70 (04 Aug 2021 05:18) (70 - 78)  BP: 124/67 (04 Aug 2021 05:18) (100/60 - 124/67)  RR: 18 (04 Aug 2021 05:18) (16 - 18)  SpO2: 98% (04 Aug 2021 05:18) (96% - 100%)    General:  Constitutional: female, appears stated age, in no apparent distress including pain  Head: Normocephalic & atraumatic.    Neurological (>12):  MS: Awake, alert, oriented to person, place, situation, month (not oriented to date and year). Normal affect. Follows all commands.    Language: Speech is clear, fluent    CNs: PERRLA (R = 3mm, L = 3mm). VFF. EOMI no nystagmus, no diplopia. V1-3 intact to LT, well developed masseter muscles b/l. No facial asymmetry b/l. Symmetric palate elevation in midline. Gag reflex deferred. Shoulder shrug intact b/l. Tongue midline, normal movements, no atrophy.     Motor: Normal muscle bulk & tone. No noticeable tremor or seizure. No pronator drift.              Deltoid	Biceps	Triceps	Wrist	Finger ABd	   R	5	5	5	5	5		5 	  L	5	5	5	5	5		5    	H-Flex	H-Ext	H-ABd	H-ADd	K-Flex	K-Ext	D-Flex	P-Flex  R	5	5	5	5	5	5	5	5 	   L	5	5	5	5	5	5	5	5	     Sensation: Intact to LT b/l throughout.     Reflexes:              Biceps(C5)    BR(C6)   Patellar(L4)       Achilles(S1)    Plantar Resp  R	2	          2	             2		 2              Down   L	2	          2	             2		 2              Down     Coordination: No dysmetria to FTN    Gait: Normal Romberg. No postural instability. Normal stance and gait.     LABORATORY:  CBC                       12.4   6.25  )-----------( 215      ( 04 Aug 2021 05:53 )             38.9     Chem 08-04    136  |  102  |  22  ----------------------------<  129<H>  3.8   |  23  |  0.45<L>    Ca    8.7      04 Aug 2021 05:53  Phos  3.6     08-04  Mg     2.4     08-04    TPro  6.7  /  Alb  4.1  /  TBili  0.3  /  DBili  x   /  AST  21  /  ALT  18  /  AlkPhos  80  08-04    LFTs LIVER FUNCTIONS - ( 04 Aug 2021 05:53 )  Alb: 4.1 g/dL / Pro: 6.7 g/dL / ALK PHOS: 80 U/L / ALT: 18 U/L / AST: 21 U/L / GGT: x           U/A Urinalysis Basic - ( 03 Aug 2021 09:40 )  Color: Light Yellow / Appearance: Slightly Turbid / S.015 / pH: x  Gluc: x / Ketone: Negative  / Bili: Negative / Urobili: Negative   Blood: x / Protein: Trace / Nitrite: Negative   Leuk Esterase: Negative / RBC: 1 /hpf / WBC 1 /HPF   Sq Epi: x / Non Sq Epi: 1 /hpf / Bacteria: Negative    Thyroid Stimulating Hormone, Serum: 14.30 uIU/mL   Free Thyroxine, Serum: 1.0 ng/dL     IMAGING:  Radiology (XR, CT, MR, U/S, TTE/LEIDY):  CT Head No Cont (21 @ 09:48)  EXAM:  CT BRAIN                        EXAM:  CT CERVICAL SPINE                        PROCEDURE DATE:  2021    INTERPRETATION:  Noncontrast CT of the brain and cervical spine  CLINICAL INDICATION:  status post fall  TECHNIQUE: Axial CT scanning of the brain and cervical spine were obtained without the administration of intravenous contrast.  Images were reformatted in the sagittal and coronal planes.  COMPARISON: CT brain 2018. CT cervical spine 2018.    FINDINGS:  CT brain:  No hydrocephalus, mass effect, midline shift, acute intracranial hemorrhage, or brain edema. Mild to moderate white matter microvascular ischemic disease.  No displaced calvarial fracture.  Visualized paranasal sinuses and mastoid air cells are clear.  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 Interval fusion of the odontoid tip to the anterior arch of C1.  No acute fracture or traumatic subluxation. No prevertebral soft tissue swelling.  Vertebral body height and facet alignment are maintained Exaggeration of the normal cervical lordosis. Intervertebral disc spaces are grossly preserved.  Multilevel degenerative changes. Extensive scarring/nodular changes at the lung apices.    IMPRESSION:  CT brain:  No acute intracranial hemorrhage, brain edema, or mass effect.  No displaced calvarial fracture.    CT cervical spine:  No acute fracture or traumatic subluxation.  No prevertebral soft tissue swelling.    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 Interval fusion of the odontoid tip to the anterior arch of C1.  Degenerative changes.
CHIEF COMPLAINT:Patient is a 81y old  Female who presents with a chief complaint of Fall (04 Aug 2021 08:55)      HPI:  81 year old female with PMH hypothyroidism who presents after a fall. Patient very forgetful-attempted to reach daughter for more history but unable to so history obtained from chart as well as friend Mya ([phone]). The patient had a mechanical fall on Monday-she denies any prodromal symptoms such as dizziness, lightheadedness, chest pain, shortness of breath or palpitations. She developed a headache and was told to go to the ER by her PCP and was brought in by her daughter. The patient complains of forgetfulness. When discussed with Mya, it has been going on for 10-12 years and only includes minor issues like forgetting where she put her phone or what she ate but the patient remains mostly independent. Additionally, Mya believes that the patient is not complaint with her thyroid medication.  Upon arrival, vital signs were /79, HR 66, RR 18, temperature 98 degrees Farenheit and saturating 99% on room air. Labs and imaging were without acute findings and she was admitted for further workup and management of her fall.       PAST MEDICAL & SURGICAL HISTORY:  HLD (hyperlipidemia)    Osteoporosis    Arthritis    Hypothyroid    COVID-19 vaccine series completed    No Past Surgical History    S/P hysterectomy        MEDICATIONS  (STANDING):  levothyroxine 50 MICROGram(s) Oral daily    MEDICATIONS  (PRN):      FAMILY HISTORY:  FH: diabetes mellitus (Mother)        SOCIAL HISTORY:    [ ] Non-smoker  [ ] Smoker  [ ] Alcohol    Allergies    No Known Allergies    Intolerances    	    REVIEW OF SYSTEMS:  CONSTITUTIONAL: No fever, weight loss, or fatigue  EYES: No eye pain, visual disturbances, or discharge  ENT:  No difficulty hearing, tinnitus, vertigo; No sinus or throat pain  NECK: No pain or stiffness  RESPIRATORY: No cough, wheezing, chills or hemoptysis; No Shortness of Breath  CARDIOVASCULAR: No chest pain, palpitations, passing out, dizziness, or leg swelling  GASTROINTESTINAL: No abdominal or epigastric pain. No nausea, vomiting, or hematemesis; No diarrhea or constipation. No melena or hematochezia.  GENITOURINARY: No dysuria, frequency, hematuria, or incontinence  NEUROLOGICAL: No headaches, memory loss, loss of strength, numbness, or tremors  SKIN: No itching, burning, rashes, or lesions   LYMPH Nodes: No enlarged glands  ENDOCRINE: No heat or cold intolerance; No hair loss  MUSCULOSKELETAL: No joint pain or swelling; No muscle, back, or extremity pain  PSYCHIATRIC: No depression, anxiety, mood swings, or difficulty sleeping  HEME/LYMPH: No easy bruising, or bleeding gums  ALLERGY AND IMMUNOLOGIC: No hives or eczema	    [ ] All others negative	  [x ] Unable to obtain    PHYSICAL EXAM:  T(C): 36.5 (08-04-21 @ 05:18), Max: 36.7 (08-03-21 @ 16:20)  HR: 70 (08-04-21 @ 05:18) (70 - 78)  BP: 124/67 (08-04-21 @ 05:18) (100/60 - 124/67)  RR: 18 (08-04-21 @ 05:18) (16 - 18)  SpO2: 98% (08-04-21 @ 05:18) (96% - 100%)  Wt(kg): --  I&O's Summary    03 Aug 2021 07:01  -  04 Aug 2021 07:00  --------------------------------------------------------  IN: 240 mL / OUT: 0 mL / NET: 240 mL        Appearance: Normal	  HEENT:   Normal oral mucosa, PERRL, EOMI	  Lymphatic: No lymphadenopathy  Cardiovascular: Normal S1 S2, No JVD, + murmurs, No edema  Respiratory: Lungs clear to auscultation	  Gastrointestinal:  Soft, Non-tender, + BS	  Skin: No rashes, No ecchymoses, No cyanosis	  Neurologic: Non-focal  Extremities: Normal range of motion, No clubbing, cyanosis or edema  Vascular: Peripheral pulses palpable 2+ bilaterally    TELEMETRY: 	    ECG:  	  RADIOLOGY:  OTHER: 	  	  LABS:	 	    CARDIAC MARKERS:                              12.4   6.25  )-----------( 215      ( 04 Aug 2021 05:53 )             38.9     08-04    136  |  102  |  22  ----------------------------<  129<H>  3.8   |  23  |  0.45<L>    Ca    8.7      04 Aug 2021 05:53  Phos  3.6     08-04  Mg     2.4     08-04    TPro  6.7  /  Alb  4.1  /  TBili  0.3  /  DBili  x   /  AST  21  /  ALT  18  /  AlkPhos  80  08-04    proBNP:   Lipid Profile:   HgA1c:   TSH:       PREVIOUS DIAGNOSTIC TESTING:    < from: 12 Lead ECG (08.03.21 @ 08:21) >  Diagnosis Line NORMAL SINUS RHYTHM  NONSPECIFIC T WAVE ABNORMALITY  ABNORMAL ECG  WHEN COMPARED WITH ECG OF 23-JUN-2021 18:45,  NO SIGNIFICANT CHANGE WAS FOUND  < from: CT Head No Cont (08.03.21 @ 09:48) >  CT brain:  No acute intracranial hemorrhage, brain edema, or mass effect.  No displaced calvarial fracture.    CT cervical spine:  No acute fracture or traumatic subluxation.  No prevertebral soft tissue swelling.    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 Interval fusion of the odontoid tip to the anterior arch of C1.    Degenerative changes.      < from: Xray Chest 1 View AP/PA (08.03.21 @ 08:57) >  The cardiomediastinal silhouette is within normal limits.  Pulmonary vascularity appears normal.  Small, chronic calcific granulomas in right subapical region.  No focal consolidations. No pleural effusion or pneumothorax.  Degenerative changes of the thoracic spine.    IMPRESSION:  No acute process.

## 2021-08-10 NOTE — PROGRESS NOTE ADULT - PROBLEM SELECTOR PROBLEM 1
Fall, initial encounter
Fall
Fall, initial encounter
Fall, initial encounter

## 2021-08-10 NOTE — PROGRESS NOTE ADULT - PROBLEM SELECTOR PLAN 2
pt   awke  alert , no  fever   monitor  RSR  not  orthostatic  ,  echo  normal  EF  no valvular  disease .  TSH 14  , poor compliance .. tried  to  reach  daughter  no  answer  .neurology  and  cardiac  consult  appreciated  , pt  is  medically  stable  for  discharge  continue on  synthroid
pt   c/o  no  pain  no  fever , unsteady  gait  been  evaluated  by  PT   monitor  RSR to   follow up  with  neurology
:pt   c/o  unsteady   gait   . pt  needs  short term  stella  for  conditioning  and  gait  training , for  safe ambulation , will   get  a  peer to peer  meeting  for  approval  of  services  .
pt   awake   alert  this  AM  no  pain  or  dizziness , labs  reviwed normal . monitor  RSR  CT   head  no mases  or  bleeding  , unable  to  reach  daughter  to   discuss  , yasmine reyna  is  negative ,.pt   c/o  forgetfull  of  names  and  places will get Vit  B12  ,folic  acis  level , RPR  neuro  consult , lipid  panel
pt   is   asymptomatic   ambulating  ,  monitor  RSR   no  arrythmia  to  continue oo  synthroid  75mcg  qd

## 2021-08-10 NOTE — PROGRESS NOTE ADULT - PROBLEM SELECTOR PROBLEM 2
Forgetfulness
Hypothyroidism, unspecified type
Hypothyroidism, unspecified type

## 2021-08-10 NOTE — CONSULT NOTE ADULT - CONSULT REASON
Fall
syncope
Assist the team in the ethical dilemma posed by an 81 year old female with questionable capacity, regarding establishing a health care proxy.

## 2021-08-10 NOTE — PROGRESS NOTE ADULT - PROBLEM SELECTOR PLAN 1
pt   c/o  no  pain  no  fever , unsteady  gait  been  evaluated  by  PT   monitor  RSR
pt   is   asymptomatic   ambulating  ,  monitor  RSR   no  arrythmia pt   stable  for  discharge discussed  discharge  with  daughter
:pt   asymptomatic   no  dizziness  monitor  RSR  no chest  pain . awaiting  PT  evaluation
pt   awake   alert  this  AM  no  pain  or  dizziness , labs  reviwed normal . monitor  RSR  CT   head  no mases  or  bleeding  , unable  to  reach  daughter  to   discuss  , stool guic  is  negative ,.pt   c/o  forgetfull  of  names  and  places will  get  neuro  consult  for evaluation
pt   awke  alert , no  fever   monitor  RSR  not  orthostatic  ,  echo  normal  EF  no valvular  disease .  TSH 14  , poor compliance .. tried  to  reach  daughter  no  answer  .neurology  and  cardiac  consult  appreciated  , pt  is  medically  stable  for  discharge  to  follow  up  in office
:pt   c/o  unsteady   gait   . pt  needs  short term  stella  for  conditioning  and  gait  training , for  safe ambulation , will   get  a  peer to peer  meeting  for  approval  of  services  .

## 2021-08-10 NOTE — PROGRESS NOTE ADULT - PROBLEM SELECTOR PLAN 3
:pt   c/o  unsteady   gait   . pt  needs  short term  stella  for  conditioning  and  gait  training , for  safe ambulation , will   get  a  peer to peer  meeting  for  approval  of  services  .c/w  synthroid  same  dose  will  check TSH  in 2 months
pt   awake   alert  this  AM  no  pain  or  dizziness , labs  reviwed normal . monitor  RSR  CT   head  no mases  or  bleeding  , unable  to  reach  daughter  to   discuss  , yasmine reyna  is  negative ,.pt   c/o  forgetfull  of  names  and  places will will  get  TSH  level
pt   awke  alert , no  fever   monitor  RSR  not  orthostatic  ,  echo  normal  EF  no valvular  disease .  TSH 14  , poor compliance .. tried  to  reach  daughter  no  answer  .neurology  and  cardiac  consult  appreciated  , pt  is  medically  stable  for  discharge to follow  at  neurology

## 2021-08-10 NOTE — PROGRESS NOTE ADULT - PROVIDER SPECIALTY LIST ADULT
Cardiology
Internal Medicine
Cardiology
Internal Medicine

## 2021-08-10 NOTE — DISCHARGE NOTE NURSING/CASE MANAGEMENT/SOCIAL WORK - NSDCVIVACCINE_GEN_ALL_CORE_FT
Tdap; 29-Apr-2018 13:13; Charu Ramirez (ANGELY); [employer]; [id number]; IntraMuscular; Deltoid Left.; 0.5 milliLiter(s); VIS (VIS Published: 09-May-2013, VIS Presented: 29-Apr-2018);

## 2021-08-10 NOTE — PROGRESS NOTE ADULT - SUBJECTIVE AND OBJECTIVE BOX
CARDIOLOGY     PROGRESS  NOTE   ________________________________________________    CHIEF COMPLAINT:Patient is a 81y old  Female who presents with a chief complaint of Fall (04 Aug 2021 11:12)    	  REVIEW OF SYSTEMS:  CONSTITUTIONAL: No fever, weight loss, or fatigue  EYES: No eye pain, visual disturbances, or discharge  ENT:  No difficulty hearing, tinnitus, vertigo; No sinus or throat pain  NECK: No pain or stiffness  RESPIRATORY: No cough, wheezing, chills or hemoptysis; No Shortness of Breath  CARDIOVASCULAR: No chest pain, palpitations, passing out, dizziness, or leg swelling  GASTROINTESTINAL: No abdominal or epigastric pain. No nausea, vomiting, or hematemesis; No diarrhea or constipation. No melena or hematochezia.  GENITOURINARY: No dysuria, frequency, hematuria, or incontinence  NEUROLOGICAL: No headaches, memory loss, loss of strength, numbness, or tremors  SKIN: No itching, burning, rashes, or lesions   LYMPH Nodes: No enlarged glands  ENDOCRINE: No heat or cold intolerance; No hair loss  MUSCULOSKELETAL: No joint pain or swelling; No muscle, back, or extremity pain  PSYCHIATRIC: No depression, anxiety, mood swings, or difficulty sleeping  HEME/LYMPH: No easy bruising, or bleeding gums  ALLERGY AND IMMUNOLOGIC: No hives or eczema	    [ ] All others negative	  [ ] Unable to obtain    PHYSICAL EXAM:  T(C): 36.6 (08-05-21 @ 04:53), Max: 36.8 (08-04-21 @ 12:10)  HR: 71 (08-05-21 @ 04:53) (70 - 75)  BP: 145/77 (08-05-21 @ 04:53) (99/60 - 145/77)  RR: 18 (08-05-21 @ 04:53) (17 - 18)  SpO2: 99% (08-05-21 @ 04:53) (96% - 99%)  Wt(kg): --  I&O's Summary    04 Aug 2021 07:01  -  05 Aug 2021 07:00  --------------------------------------------------------  IN: 519 mL / OUT: 0 mL / NET: 519 mL        Appearance: Normal	  HEENT:   Normal oral mucosa, PERRL, EOMI	  Lymphatic: No lymphadenopathy  Cardiovascular: Normal S1 S2, No JVD, + murmurs, No edema  Respiratory: Lungs clear to auscultation	  Psychiatry: A & O x 3, Mood & affect appropriate  Gastrointestinal:  Soft, Non-tender, + BS	  Skin: No rashes, No ecchymoses, No cyanosis	  Neurologic: Non-focal  Extremities: Normal range of motion, No clubbing, cyanosis or edema  Vascular: Peripheral pulses palpable 2+ bilaterally    MEDICATIONS  (STANDING):  heparin   Injectable 5000 Unit(s) SubCutaneous every 12 hours  levothyroxine 50 MICROGram(s) Oral daily      TELEMETRY: 	    ECG:  	  RADIOLOGY:  OTHER: 	  	  LABS:	 	    CARDIAC MARKERS:                                12.4   6.25  )-----------( 215      ( 04 Aug 2021 05:53 )             38.9     08-04    136  |  102  |  22  ----------------------------<  129<H>  3.8   |  23  |  0.45<L>    Ca    8.7      04 Aug 2021 05:53  Phos  3.6     08-04  Mg     2.4     08-04    TPro  6.7  /  Alb  4.1  /  TBili  0.3  /  DBili  x   /  AST  21  /  ALT  18  /  AlkPhos  80  08-04    proBNP: Serum Pro-Brain Natriuretic Peptide: 107 pg/mL (08-03 @ 07:57)    Lipid Profile:   HgA1c:   TSH: Thyroid Stimulating Hormone, Serum: 14.30 uIU/mL (08-04 @ 08:43)  < from: Transthoracic Echocardiogram (08.04.21 @ 15:25) >  Mitral Valve: Mild mitral annular calcification, otherwise  normal mitral valve. Mild mitral regurgitation.  Aortic Valve/Aorta: Aortic valve not well visualized;  appears to be a normal trileaflet valve with normal  opening. Minimal aortic regurgitation.  Aortic Root: 3.3 cm.  Left Atrium: Normal left atrium.  LA volume index = 27  cc/m2.  Left Ventricle: Normal left ventricular systolic function.  No segmental wall motion abnormalities. Normal left  ventricular internal dimensions and wall thicknesses.  Normal diastolic function.  Right Heart: Normal right atrium. Normal right ventricular  size and function. Normal tricuspid valve. Minimal  tricuspid regurgitation. Pulmonic valve not well  visualized, probably normal. No pulmonic regurgitation.  Pericardium/Pleura: Normal pericardium with no pericardial  effusion.  Hemodynamic: Estimated right ventricular systolic pressure  equals 33 mm Hg, assuming right atrial pressure equals 8 mm  Hg, consistent with normal pulmonary pressures.  ------------------------------------------------------------------------  Conclusions:  1. Mild mitral annular calcification, otherwise normal  mitral valve. Mild mitral regurgitation.  2. Aortic valve not well visualized; appears to be a normal  trileaflet valve with normal opening. Minimal aortic  regurgitation.  3. Normal left ventricular systolic function. No segmental  wall motion abnormalities.  4. Normal right ventricular size and function.    [] Pre-syncope work-up as per primary team and cardiology  [] Optimization of electrolytes  [] check B12, folate  [] Outpatient follow-up for neurocognitive evaluation with Dr. Hernandez or Dr. Rojas at [address] [place] [address]. [phone]        Assessment and plan  ---------------------------  81 year old female with PMH hypothyroidism who presents after a fall. Patient very forgetful-attempted to reach daughter for more history but unable to so history obtained from chart as well as friend Mya ([phone]). The patient had a mechanical fall on Monday-she denies any prodromal symptoms such as dizziness, lightheadedness, chest pain, shortness of breath or palpitations. She developed a headache and was told to go to the ER by her PCP and was brought in by her daughter. The patient complains of forgetfulness. When discussed with Mya, it has been going on for 10-12 years and only includes minor issues like forgetting where she put her phone or what she ate but the patient remains mostly independent. Additionally, Mya believes that the patient is not complaint with her thyroid medication.  Upon arrival, vital signs were /79, HR 66, RR 18, temperature 98 degrees Farenheit and saturating 99% on room air. Labs and imaging were without acute findings and she was admitted for further workup and management of her fall.  pt with hx ?dementia with s/p multiple falls ?syncope  negative for  orthostatic hypotension  tsh increase adjust thyroid dose  tele so far no arrythmia  b12 level  physical therapy  echo noted , normal ef , no valvular heart disease  will consider possible tilt test as out pt  no evidence of conduction disease on tele  ?further neuro work up  asa daily    	        
           CARDIOLOGY     PROGRESS  NOTE   ________________________________________________    CHIEF COMPLAINT:Patient is a 81y old  Female who presents with a chief complaint of Fall (05 Aug 2021 16:49)  no complain.  	  REVIEW OF SYSTEMS:  CONSTITUTIONAL: No fever, weight loss, or fatigue  EYES: No eye pain, visual disturbances, or discharge  ENT:  No difficulty hearing, tinnitus, vertigo; No sinus or throat pain  NECK: No pain or stiffness  RESPIRATORY: No cough, wheezing, chills or hemoptysis; No Shortness of Breath  CARDIOVASCULAR: No chest pain, palpitations, passing out, dizziness, or leg swelling  GASTROINTESTINAL: No abdominal or epigastric pain. No nausea, vomiting, or hematemesis; No diarrhea or constipation. No melena or hematochezia.  GENITOURINARY: No dysuria, frequency, hematuria, or incontinence  NEUROLOGICAL: No headaches, memory loss, loss of strength, numbness, or tremors  SKIN: No itching, burning, rashes, or lesions   LYMPH Nodes: No enlarged glands  ENDOCRINE: No heat or cold intolerance; No hair loss  MUSCULOSKELETAL: No joint pain or swelling; No muscle, back, or extremity pain  PSYCHIATRIC: No depression, anxiety, mood swings, or difficulty sleeping  HEME/LYMPH: No easy bruising, or bleeding gums  ALLERGY AND IMMUNOLOGIC: No hives or eczema	    [ ] All others negative	  [ ] Unable to obtain    PHYSICAL EXAM:  T(C): 36.4 (08-06-21 @ 04:45), Max: 36.8 (08-05-21 @ 12:19)  HR: 80 (08-06-21 @ 04:45) (64 - 82)  BP: 114/72 (08-06-21 @ 04:45) (114/72 - 147/67)  RR: 18 (08-06-21 @ 04:45) (18 - 18)  SpO2: 98% (08-06-21 @ 04:45) (98% - 99%)  Wt(kg): --  I&O's Summary    05 Aug 2021 07:01  -  06 Aug 2021 07:00  --------------------------------------------------------  IN: 360 mL / OUT: 0 mL / NET: 360 mL        Appearance: Normal	  HEENT:   Normal oral mucosa, PERRL, EOMI	  Lymphatic: No lymphadenopathy  Cardiovascular: Normal S1 S2, No JVD, + murmurs, No edema  Respiratory: Lungs clear to auscultation	  Psychiatry: A & O x 3, Mood & affect appropriate  Gastrointestinal:  Soft, Non-tender, + BS	  Skin: No rashes, No ecchymoses, No cyanosis	  Neurologic: Non-focal  Extremities: Normal range of motion, No clubbing, cyanosis or edema  Vascular: Peripheral pulses palpable 2+ bilaterally    MEDICATIONS  (STANDING):  atorvastatin 20 milliGRAM(s) Oral at bedtime  heparin   Injectable 5000 Unit(s) SubCutaneous every 12 hours  levothyroxine 50 MICROGram(s) Oral daily      TELEMETRY: 	    ECG:  	  RADIOLOGY:  OTHER: 	  	  LABS:	 	    CARDIAC MARKERS:                                x      x     )-----------( x        ( 05 Aug 2021 08:50 )             38.0           proBNP: Serum Pro-Brain Natriuretic Peptide: 107 pg/mL (08-03 @ 07:57)    Lipid Profile: Cholesterol 232  LDL --  HDL 63  TG 76    HgA1c:   TSH: Thyroid Stimulating Hormone, Serum: 12.20 uIU/mL (08-05 @ 10:07)  Thyroid Stimulating Hormone, Serum: 14.30 uIU/mL (08-04 @ 08:43)    [] Pre-syncope work-up as per primary team and cardiology  [] Optimization of electrolytes  [] check B12, folate  [] Outpatient follow-up for neurocognitive evaluation with Dr. Hernandez or Dr. Rojas at [address] [place] [address]. ([phone]      Assessment and plan  ---------------------------  81 year old female with PMH hypothyroidism who presents after a fall. Patient very forgetful-attempted to reach daughter for more history but unable to so history obtained from chart as well as friend Mya ([phone]). The patient had a mechanical fall on Monday-she denies any prodromal symptoms such as dizziness, lightheadedness, chest pain, shortness of breath or palpitations. She developed a headache and was told to go to the ER by her PCP and was brought in by her daughter. The patient complains of forgetfulness. When discussed with Mya, it has been going on for 10-12 years and only includes minor issues like forgetting where she put her phone or what she ate but the patient remains mostly independent. Additionally, Mya believes that the patient is not complaint with her thyroid medication.  Upon arrival, vital signs were /79, HR 66, RR 18, temperature 98 degrees Farenheit and saturating 99% on room air. Labs and imaging were without acute findings and she was admitted for further workup and management of her fall.  pt with hx ?dementia with s/p multiple falls ?syncope  negative for  orthostatic hypotension  tsh increase adjust thyroid dose  tele so far no arrythmia  b12 level  physical therapy  echo noted , normal ef , no valvular heart disease  will consider possible tilt test as out pt  no evidence of conduction disease on tele  ?further neuro work up as out pt  asa daily  medicine noted    	        
           CARDIOLOGY     PROGRESS  NOTE   ________________________________________________    CHIEF COMPLAINT:Patient is a 81y old  Female who presents with a chief complaint of Fall (09 Aug 2021 08:36)  no complain.  	  REVIEW OF SYSTEMS:  CONSTITUTIONAL: No fever, weight loss, or fatigue  EYES: No eye pain, visual disturbances, or discharge  ENT:  No difficulty hearing, tinnitus, vertigo; No sinus or throat pain  NECK: No pain or stiffness  RESPIRATORY: No cough, wheezing, chills or hemoptysis; No Shortness of Breath  CARDIOVASCULAR: No chest pain, palpitations, passing out, dizziness, or leg swelling  GASTROINTESTINAL: No abdominal or epigastric pain. No nausea, vomiting, or hematemesis; No diarrhea or constipation. No melena or hematochezia.  GENITOURINARY: No dysuria, frequency, hematuria, or incontinence  NEUROLOGICAL: No headaches, memory loss, loss of strength, numbness, or tremors  SKIN: No itching, burning, rashes, or lesions   LYMPH Nodes: No enlarged glands  ENDOCRINE: No heat or cold intolerance; No hair loss  MUSCULOSKELETAL: No joint pain or swelling; No muscle, back, or extremity pain  PSYCHIATRIC: No depression, anxiety, mood swings, or difficulty sleeping  HEME/LYMPH: No easy bruising, or bleeding gums  ALLERGY AND IMMUNOLOGIC: No hives or eczema	    [ ] All others negative	  [ ] Unable to obtain    PHYSICAL EXAM:  T(C): 36.5 (08-10-21 @ 04:22), Max: 36.6 (08-09-21 @ 11:47)  HR: 73 (08-10-21 @ 04:22) (73 - 73)  BP: 129/76 (08-10-21 @ 04:22) (105/60 - 129/76)  RR: 18 (08-10-21 @ 04:22) (18 - 18)  SpO2: 98% (08-10-21 @ 04:22) (97% - 98%)  Wt(kg): --  I&O's Summary    09 Aug 2021 07:01  -  10 Aug 2021 07:00  --------------------------------------------------------  IN: 1060 mL / OUT: 0 mL / NET: 1060 mL        Appearance: Normal	  HEENT:   Normal oral mucosa, PERRL, EOMI	  Lymphatic: No lymphadenopathy  Cardiovascular: Normal S1 S2, No JVD, + murmurs, No edema  Respiratory: Lungs clear to auscultation	  Psychiatry: A & O x 3, Mood & affect appropriate  Gastrointestinal:  Soft, Non-tender, + BS	  Skin: No rashes, No ecchymoses, No cyanosis	  Neurologic: Non-focal  Extremities: Normal range of motion, No clubbing, cyanosis or edema  Vascular: Peripheral pulses palpable 2+ bilaterally    MEDICATIONS  (STANDING):  aspirin enteric coated 81 milliGRAM(s) Oral daily  atorvastatin 20 milliGRAM(s) Oral at bedtime  heparin   Injectable 5000 Unit(s) SubCutaneous every 12 hours  levothyroxine 50 MICROGram(s) Oral daily      TELEMETRY: 	    ECG:  	  RADIOLOGY:  OTHER: 	  	  LABS:	 	    CARDIAC MARKERS:                  proBNP: Serum Pro-Brain Natriuretic Peptide: 107 pg/mL (08-03 @ 07:57)    Lipid Profile: Cholesterol 232  LDL --  HDL 63  TG 76    HgA1c:   TSH: Thyroid Stimulating Hormone, Serum: 13.00 uIU/mL (08-07 @ 09:12)  Thyroid Stimulating Hormone, Serum: 12.20 uIU/mL (08-05 @ 10:07)  Thyroid Stimulating Hormone, Serum: 14.30 uIU/mL (08-04 @ 08:43)    Notes: Social work continues to follow patient on 2 [facility] due patient being  undomiciled. Social work received a call from the unit stating that patients  daughter is visiting and is requesting taking her daughter home. LMSW stated  she would visit the floor to speak with patients daughter. LMSW went to the  unit to speak with patients daughter but she had left the hospital to go to  work. LMSW noticed patient had a HCP in the chart. Patient is documented as  confused in the chart. LMSW explained that the HCP is not valid if the patient  is unable to give consent. LMSW spoke with the Nurse Manager to inform them of  the invalid HCP. LMSW stated they would contact Ethics for guidance. LMSW and  Ethics discussed the case. Ethics expressed they would speak with the patient  tomorrow to assess what her mental status is. LMSW informed Senior Social  Worker of the Ethics referral. LMSW left a message for daughter to review  discharge planning due to daughter stating she does not want [facility] anymore. Esme  at [facility] requested a peer to peer for the [facility] Authorization. Esme  requested a Peer to Peer conversation. LMSW contacted patients physician to  request a peer to peer. Patients physician was agreeable and LMSW provided the  contact information for the peer to peer. Social work will continue to  collaborate with interdisciplinary team.      Assessment and plan  ---------------------------  81 year old female with PMH hypothyroidism who presents after a fall. Patient very forgetful-attempted to reach daughter for more history but unable to so history obtained from chart as well as friend Mya ([phone]). The patient had a mechanical fall on Monday-she denies any prodromal symptoms such as dizziness, lightheadedness, chest pain, shortness of breath or palpitations. She developed a headache and was told to go to the ER by her PCP and was brought in by her daughter. The patient complains of forgetfulness. When discussed with Mya, it has been going on for 10-12 years and only includes minor issues like forgetting where she put her phone or what she ate but the patient remains mostly independent. Additionally, Mya believes that the patient is not complaint with her thyroid medication.  Upon arrival, vital signs were /79, HR 66, RR 18, temperature 98 degrees Farenheit and saturating 99% on room air. Labs and imaging were without acute findings and she was admitted for further workup and management of her fall.  pt with hx ?dementia with s/p multiple falls ?syncope  negative for  orthostatic hypotension  tsh increase adjust thyroid dose  tele so far no arrythmia  b12 level  physical therapy  echo noted , normal ef , no valvular heart disease  will consider possible tilt test as out pt  no evidence of conduction disease on tele  ?further neuro work up as out pt  asa daily  medicine noted  bp is well controlled/ continue PT  will be schedule for stress test as out pt  add asa daily  dc planning  	        
           CARDIOLOGY     PROGRESS  NOTE   ________________________________________________    CHIEF COMPLAINT:Patient is a 81y old  Female who presents with a chief complaint of Fall (06 Aug 2021 08:34)  no complain.  	  REVIEW OF SYSTEMS:  CONSTITUTIONAL: No fever, weight loss, or fatigue  EYES: No eye pain, visual disturbances, or discharge  ENT:  No difficulty hearing, tinnitus, vertigo; No sinus or throat pain  NECK: No pain or stiffness  RESPIRATORY: No cough, wheezing, chills or hemoptysis; No Shortness of Breath  CARDIOVASCULAR: No chest pain, palpitations, passing out, dizziness, or leg swelling  GASTROINTESTINAL: No abdominal or epigastric pain. No nausea, vomiting, or hematemesis; No diarrhea or constipation. No melena or hematochezia.  GENITOURINARY: No dysuria, frequency, hematuria, or incontinence  NEUROLOGICAL: No headaches, memory loss, loss of strength, numbness, or tremors  SKIN: No itching, burning, rashes, or lesions   LYMPH Nodes: No enlarged glands  ENDOCRINE: No heat or cold intolerance; No hair loss  MUSCULOSKELETAL: No joint pain or swelling; No muscle, back, or extremity pain  PSYCHIATRIC: No depression, anxiety, mood swings, or difficulty sleeping  HEME/LYMPH: No easy bruising, or bleeding gums  ALLERGY AND IMMUNOLOGIC: No hives or eczema	    [ ] All others negative	  [ ] Unable to obtain    PHYSICAL EXAM:  T(C): 36.5 (08-07-21 @ 04:48), Max: 36.6 (08-06-21 @ 12:45)  HR: 71 (08-07-21 @ 04:48) (71 - 79)  BP: 104/62 (08-07-21 @ 04:48) (104/62 - 139/73)  RR: 18 (08-07-21 @ 04:48) (16 - 18)  SpO2: 99% (08-07-21 @ 04:48) (99% - 99%)  Wt(kg): --  I&O's Summary    06 Aug 2021 07:01  -  07 Aug 2021 07:00  --------------------------------------------------------  IN: 720 mL / OUT: 0 mL / NET: 720 mL        Appearance: Normal	  HEENT:   Normal oral mucosa, PERRL, EOMI	  Lymphatic: No lymphadenopathy  Cardiovascular: Normal S1 S2, No JVD, + murmurs, No edema  Respiratory: Lungs clear to auscultation	  Psychiatry: A & O x 3, Mood & affect appropriate  Gastrointestinal:  Soft, Non-tender, + BS	  Skin: No rashes, No ecchymoses, No cyanosis	  Neurologic: Non-focal  Extremities: Normal range of motion, No clubbing, cyanosis or edema  Vascular: Peripheral pulses palpable 2+ bilaterally    MEDICATIONS  (STANDING):  atorvastatin 20 milliGRAM(s) Oral at bedtime  heparin   Injectable 5000 Unit(s) SubCutaneous every 12 hours  levothyroxine 50 MICROGram(s) Oral daily      TELEMETRY: 	    ECG:  	  RADIOLOGY:  OTHER: 	  	  LABS:	 	    CARDIAC MARKERS:                                x      x     )-----------( x        ( 05 Aug 2021 08:50 )             38.0     08-07    137  |  101  |  17  ----------------------------<  115<H>  4.2   |  23  |  0.52    Ca    9.0      07 Aug 2021 06:37      proBNP: Serum Pro-Brain Natriuretic Peptide: 107 pg/mL (08-03 @ 07:57)    Lipid Profile: Cholesterol 232  LDL --  HDL 63  TG 76    HgA1c:   TSH: Thyroid Stimulating Hormone, Serum: 12.20 uIU/mL (08-05 @ 10:07)  Thyroid Stimulating Hormone, Serum: 14.30 uIU/mL (08-04 @ 08:43)    < from: Transthoracic Echocardiogram (08.04.21 @ 15:25) >  Mitral Valve: Mild mitral annular calcification, otherwise  normal mitral valve. Mild mitral regurgitation.  Aortic Valve/Aorta: Aortic valve not well visualized;  appears to be a normal trileaflet valve with normal  opening. Minimal aortic regurgitation.  Aortic Root: 3.3 cm.  Left Atrium: Normal left atrium.  LA volume index = 27  cc/m2.  Left Ventricle: Normal left ventricular systolic function.  No segmental wall motion abnormalities. Normal left  ventricular internal dimensions and wall thicknesses.  Normal diastolic function.  Right Heart: Normal right atrium. Normal right ventricular  size and function. Normal tricuspid valve. Minimal  tricuspid regurgitation. Pulmonic valve not well  visualized, probably normal. No pulmonic regurgitation.  Pericardium/Pleura: Normal pericardium with no pericardial  effusion.  Hemodynamic: Estimated right ventricular systolic pressure  equals 33 mm Hg, assuming right atrial pressure equals 8 mm  Hg, consistent with normal pulmonary pressures.  ------------------------------------------------------------------------  Conclusions:  1. Mild mitral annular calcification, otherwise normal  mitral valve. Mild mitral regurgitation.  2. Aortic valve not well visualized; appears to be a normal  trileaflet valve with normal opening. Minimal aortic  regurgitation.  3. Normal left ventricular systolic function. No segmental  wall motion abnormalities.  4. Normal right ventricular size and function.          Assessment and plan  ---------------------------  81 year old female with PMH hypothyroidism who presents after a fall. Patient very forgetful-attempted to reach daughter for more history but unable to so history obtained from chart as well as friend Mya ([phone]). The patient had a mechanical fall on Monday-she denies any prodromal symptoms such as dizziness, lightheadedness, chest pain, shortness of breath or palpitations. She developed a headache and was told to go to the ER by her PCP and was brought in by her daughter. The patient complains of forgetfulness. When discussed with Mya, it has been going on for 10-12 years and only includes minor issues like forgetting where she put her phone or what she ate but the patient remains mostly independent. Additionally, Mya believes that the patient is not complaint with her thyroid medication.  Upon arrival, vital signs were /79, HR 66, RR 18, temperature 98 degrees Farenheit and saturating 99% on room air. Labs and imaging were without acute findings and she was admitted for further workup and management of her fall.  pt with hx ?dementia with s/p multiple falls ?syncope  negative for  orthostatic hypotension  tsh increase adjust thyroid dose  tele so far no arrythmia  b12 level  physical therapy  echo noted , normal ef , no valvular heart disease  will consider possible tilt test as out pt  no evidence of conduction disease on tele  ?further neuro work up as out pt  asa daily  medicine noted  bp is well controlled/ continue PT    	        
Chief complaint: pt   is   asymptomatic   ambulating  ,  monitor  RSR   no  arrythmia     HPI:  81 year old female with PMH hypothyroidism who presents after a fall. Patient very forgetful-attempted to reach daughter for more history but unable to so history obtained from chart as well as friend Mya ([phone]). The patient had a mechanical fall on Monday-she denies any prodromal symptoms such as dizziness, lightheadedness, chest pain, shortness of breath or palpitations. She developed a headache and was told to go to the ER by her PCP and was brought in by her daughter. The patient complains of forgetfulness. When discussed with Mya, it has been going on for 10-12 years and only includes minor issues like forgetting where she put her phone or what she ate but the patient remains mostly independent. Additionally, Almavaibhav believes that the patient is not complaint with her thyroid medication.  Upon arrival, vital signs were /79, HR 66, RR 18, temperature 98 degrees Farenheit and saturating 99% on room air. Labs and imaging were without acute findings and she was admitted for further workup and management of her fall. (03 Aug 2021 12:24)      REVIEW OF SYSTEMS:    CONSTITUTIONAL: No fever, weight loss, or fatigue  NECK: No pain or stiffness  RESPIRATORY: No cough, wheezing, chills or hemoptysis; No shortness of breath  CARDIOVASCULAR: No chest pain, palpitations, dizziness, or leg swelling  GASTROINTESTINAL: No abdominal or epigastric pain. No nausea, vomiting, or hematemesis; No diarrhea or constipation. No melena or hematochezia.  GENITOURINARY: No dysuria, frequency, hematuria, or incontinence  NEUROLOGICAL: No headaches, memory loss, loss of strength, numbness, or tremors  SKIN: No itching, burning, rashes, or lesions   LYMPH NODES: No enlarged glands  MUSCULOSKELETAL: No joint pain or swelling; No muscle, back, or extremity pain  HEME/LYMPH: No easy bruising, or bleeding gums    MEDICATIONS  (STANDING):  atorvastatin 20 milliGRAM(s) Oral at bedtime  heparin   Injectable 5000 Unit(s) SubCutaneous every 12 hours  levothyroxine 50 MICROGram(s) Oral daily    MEDICATIONS  (PRN):      Allergies    No Known Allergies    Intolerances        Vital Signs Last 24 Hrs  T(C): 36.4 (06 Aug 2021 04:45), Max: 36.8 (05 Aug 2021 12:19)  T(F): 97.5 (06 Aug 2021 04:45), Max: 98.3 (05 Aug 2021 12:19)  HR: 80 (06 Aug 2021 04:45) (64 - 82)  BP: 114/72 (06 Aug 2021 04:45) (114/72 - 147/67)  BP(mean): --  RR: 18 (06 Aug 2021 04:45) (18 - 18)  SpO2: 98% (06 Aug 2021 04:45) (98% - 99%)    PHYSICAL EXAM:    GENERAL: NAD, well-groomed, well-developed  HEAD:  Atraumatic, Normocephalic  EYES: EOMI, PERRLA, conjunctiva and sclera clear  ENMT: No tonsillar erythema, exudates, or enlargement; Moist mucous membranes, Good dentition, No lesions  NECK: Supple, No JVD, Normal thyroid  NERVOUS SYSTEM:  Alert & Oriented X3, Good concentration; Motor Strength 5/5 B/L upper and lower extremities; DTRs 2+ intact and symmetric  CHEST/LUNG: Clear to percussion bilaterally; No rales, rhonchi, wheezing, or rubs  HEART: Regular rate and rhythm; No murmurs, rubs, or gallops  ABDOMEN: Soft, Nontender, Nondistended; Bowel sounds present  EXTREMITIES:  2+ Peripheral Pulses, No clubbing, cyanosis, or edema  LYMPH: No lymphadenopathy noted  SKIN: No rashes or lesions      LABS:                        x      x     )-----------( x        ( 05 Aug 2021 08:50 )             38.0                 RADIOLOGY & ADDITIONAL STUDIES:
Chief complaint:pt   c/o  unsteady   gait   . pt  needs  short term  stella  for  conditioning  and  gait  training , for  safe ambulation , will   get  a  peer to peer  meeting  for  approval  of  services  .    HPI:  81 year old female with PMH hypothyroidism who presents after a fall. Patient very forgetful-attempted to reach daughter for more history but unable to so history obtained from chart as well as friend Mya ([phone]). The patient had a mechanical fall on Monday-she denies any prodromal symptoms such as dizziness, lightheadedness, chest pain, shortness of breath or palpitations. She developed a headache and was told to go to the ER by her PCP and was brought in by her daughter. The patient complains of forgetfulness. When discussed with Mya, it has been going on for 10-12 years and only includes minor issues like forgetting where she put her phone or what she ate but the patient remains mostly independent. Additionally, Almavaibhav believes that the patient is not complaint with her thyroid medication.  Upon arrival, vital signs were /79, HR 66, RR 18, temperature 98 degrees Farenheit and saturating 99% on room air. Labs and imaging were without acute findings and she was admitted for further workup and management of her fall. (03 Aug 2021 12:24)      REVIEW OF SYSTEMS:    CONSTITUTIONAL: No fever, weight loss, or fatigue  NECK: No pain or stiffness  RESPIRATORY: No cough, wheezing, chills or hemoptysis; No shortness of breath  CARDIOVASCULAR: No chest pain, palpitations, dizziness, or leg swelling  GASTROINTESTINAL: No abdominal or epigastric pain. No nausea, vomiting, or hematemesis; No diarrhea or constipation. No melena or hematochezia.  GENITOURINARY: No dysuria, frequency, hematuria, or incontinence  NEUROLOGICAL: No headaches, memory loss, loss of strength, numbness, or tremors  SKIN: No itching, burning, rashes, or lesions   LYMPH NODES: No enlarged glands  MUSCULOSKELETAL: No joint pain or swelling; No muscle, back, or extremity pain  HEME/LYMPH: No easy bruising, or bleeding gums    MEDICATIONS  (STANDING):  aspirin enteric coated 81 milliGRAM(s) Oral daily  atorvastatin 20 milliGRAM(s) Oral at bedtime  heparin   Injectable 5000 Unit(s) SubCutaneous every 12 hours  levothyroxine 50 MICROGram(s) Oral daily    MEDICATIONS  (PRN):      Allergies    No Known Allergies    Intolerances        Vital Signs Last 24 Hrs  T(C): 36.5 (10 Aug 2021 04:22), Max: 36.6 (09 Aug 2021 11:47)  T(F): 97.7 (10 Aug 2021 04:22), Max: 97.9 (09 Aug 2021 11:47)  HR: 73 (10 Aug 2021 04:22) (73 - 73)  BP: 129/76 (10 Aug 2021 04:22) (105/60 - 129/76)  BP(mean): --  RR: 18 (10 Aug 2021 04:22) (18 - 18)  SpO2: 98% (10 Aug 2021 04:22) (97% - 98%)    PHYSICAL EXAM:    GENERAL: NAD, well-groomed, well-developed  HEAD:  Atraumatic, Normocephalic  EYES: EOMI, PERRLA, conjunctiva and sclera clear  ENMT: No tonsillar erythema, exudates, or enlargement; Moist mucous membranes, Good dentition, No lesions  NECK: Supple, No JVD, Normal thyroid  NERVOUS SYSTEM:  Alert & Oriented X3, Good concentration; Motor Strength 5/5 B/L upper and lower extremities; DTRs 2+ intact and symmetric  CHEST/LUNG: Clear to percussion bilaterally; No rales, rhonchi, wheezing, or rubs  HEART: Regular rate and rhythm; No murmurs, rubs, or gallops  ABDOMEN: Soft, Nontender, Nondistended; Bowel sounds present  EXTREMITIES:  2+ Peripheral Pulses, No clubbing, cyanosis, or edema  LYMPH: No lymphadenopathy noted  SKIN: No rashes or lesions      LABS:                RADIOLOGY & ADDITIONAL STUDIES:
           CARDIOLOGY     PROGRESS  NOTE   ________________________________________________    CHIEF COMPLAINT:Patient is a 81y old  Female who presents with a chief complaint of Fall (08 Aug 2021 08:52)  no complain.  	  REVIEW OF SYSTEMS:  CONSTITUTIONAL: No fever, weight loss, or fatigue  EYES: No eye pain, visual disturbances, or discharge  ENT:  No difficulty hearing, tinnitus, vertigo; No sinus or throat pain  NECK: No pain or stiffness  RESPIRATORY: No cough, wheezing, chills or hemoptysis; No Shortness of Breath  CARDIOVASCULAR: No chest pain, palpitations, passing out, dizziness, or leg swelling  GASTROINTESTINAL: No abdominal or epigastric pain. No nausea, vomiting, or hematemesis; No diarrhea or constipation. No melena or hematochezia.  GENITOURINARY: No dysuria, frequency, hematuria, or incontinence  NEUROLOGICAL: No headaches, memory loss, loss of strength, numbness, or tremors  SKIN: No itching, burning, rashes, or lesions   LYMPH Nodes: No enlarged glands  ENDOCRINE: No heat or cold intolerance; No hair loss  MUSCULOSKELETAL: No joint pain or swelling; No muscle, back, or extremity pain  PSYCHIATRIC: No depression, anxiety, mood swings, or difficulty sleeping  HEME/LYMPH: No easy bruising, or bleeding gums  ALLERGY AND IMMUNOLOGIC: No hives or eczema	    [ ] All others negative	  [ ] Unable to obtain    PHYSICAL EXAM:  T(C): 36.5 (08-08-21 @ 05:24), Max: 36.8 (08-07-21 @ 12:20)  HR: 72 (08-08-21 @ 05:24) (70 - 73)  BP: 137/75 (08-08-21 @ 05:24) (126/73 - 137/75)  RR: 16 (08-08-21 @ 05:24) (16 - 18)  SpO2: 100% (08-08-21 @ 05:24) (97% - 100%)  Wt(kg): --  I&O's Summary    07 Aug 2021 07:01  -  08 Aug 2021 07:00  --------------------------------------------------------  IN: 720 mL / OUT: 0 mL / NET: 720 mL        Appearance: Normal	  HEENT:   Normal oral mucosa, PERRL, EOMI	  Lymphatic: No lymphadenopathy  Cardiovascular: Normal S1 S2, No JVD, + murmurs, No edema  Respiratory: Lungs clear to auscultation	  Psychiatry: A & O x 3, Mood & affect appropriate  Gastrointestinal:  Soft, Non-tender, + BS	  Skin: No rashes, No ecchymoses, No cyanosis	  Neurologic: Non-focal  Extremities: Normal range of motion, No clubbing, cyanosis or edema  Vascular: Peripheral pulses palpable 2+ bilaterally    MEDICATIONS  (STANDING):  atorvastatin 20 milliGRAM(s) Oral at bedtime  heparin   Injectable 5000 Unit(s) SubCutaneous every 12 hours  levothyroxine 50 MICROGram(s) Oral daily      TELEMETRY: 	    ECG:  	  RADIOLOGY:  OTHER: 	  	  LABS:	 	    CARDIAC MARKERS:            08-07    137  |  101  |  17  ----------------------------<  115<H>  4.2   |  23  |  0.52    Ca    9.0      07 Aug 2021 06:37      proBNP: Serum Pro-Brain Natriuretic Peptide: 107 pg/mL (08-03 @ 07:57)    Lipid Profile: Cholesterol 232  LDL --  HDL 63  TG 76    HgA1c:   TSH: Thyroid Stimulating Hormone, Serum: 13.00 uIU/mL (08-07 @ 09:12)  Thyroid Stimulating Hormone, Serum: 12.20 uIU/mL (08-05 @ 10:07)  Thyroid Stimulating Hormone, Serum: 14.30 uIU/mL (08-04 @ 08:43)          Assessment and plan  ---------------------------  81 year old female with PMH hypothyroidism who presents after a fall. Patient very forgetful-attempted to reach daughter for more history but unable to so history obtained from chart as well as friend Darlinejulieta ([phone]). The patient had a mechanical fall on Monday-she denies any prodromal symptoms such as dizziness, lightheadedness, chest pain, shortness of breath or palpitations. She developed a headache and was told to go to the ER by her PCP and was brought in by her daughter. The patient complains of forgetfulness. When discussed with Mya, it has been going on for 10-12 years and only includes minor issues like forgetting where she put her phone or what she ate but the patient remains mostly independent. Additionally, Mya believes that the patient is not complaint with her thyroid medication.  Upon arrival, vital signs were /79, HR 66, RR 18, temperature 98 degrees Farenheit and saturating 99% on room air. Labs and imaging were without acute findings and she was admitted for further workup and management of her fall.  pt with hx ?dementia with s/p multiple falls ?syncope  negative for  orthostatic hypotension  tsh increase adjust thyroid dose  tele so far no arrythmia  b12 level  physical therapy  echo noted , normal ef , no valvular heart disease  will consider possible tilt test as out pt  no evidence of conduction disease on tele  ?further neuro work up as out pt  asa daily  medicine noted  bp is well controlled/ continue PT    	        
           CARDIOLOGY     PROGRESS  NOTE   ________________________________________________    CHIEF COMPLAINT:Patient is a 81y old  Female who presents with a chief complaint of Fall (08 Aug 2021 09:15)  no complain.  	  REVIEW OF SYSTEMS:  CONSTITUTIONAL: No fever, weight loss, or fatigue  EYES: No eye pain, visual disturbances, or discharge  ENT:  No difficulty hearing, tinnitus, vertigo; No sinus or throat pain  NECK: No pain or stiffness  RESPIRATORY: No cough, wheezing, chills or hemoptysis; No Shortness of Breath  CARDIOVASCULAR: No chest pain, palpitations, passing out, dizziness, or leg swelling  GASTROINTESTINAL: No abdominal or epigastric pain. No nausea, vomiting, or hematemesis; No diarrhea or constipation. No melena or hematochezia.  GENITOURINARY: No dysuria, frequency, hematuria, or incontinence  NEUROLOGICAL: No headaches, memory loss, loss of strength, numbness, or tremors  SKIN: No itching, burning, rashes, or lesions   LYMPH Nodes: No enlarged glands  ENDOCRINE: No heat or cold intolerance; No hair loss  MUSCULOSKELETAL: No joint pain or swelling; No muscle, back, or extremity pain  PSYCHIATRIC: No depression, anxiety, mood swings, or difficulty sleeping  HEME/LYMPH: No easy bruising, or bleeding gums  ALLERGY AND IMMUNOLOGIC: No hives or eczema	    [ ] All others negative	  [ ] Unable to obtain    PHYSICAL EXAM:  T(C): 36.6 (08-09-21 @ 04:08), Max: 36.8 (08-08-21 @ 14:30)  HR: 76 (08-09-21 @ 04:08) (72 - 82)  BP: 144/79 (08-09-21 @ 04:08) (116/57 - 144/79)  RR: 18 (08-09-21 @ 04:08) (18 - 20)  SpO2: 99% (08-09-21 @ 04:08) (99% - 99%)  Wt(kg): --  I&O's Summary    08 Aug 2021 07:01  -  09 Aug 2021 07:00  --------------------------------------------------------  IN: 840 mL / OUT: 0 mL / NET: 840 mL        Appearance: Normal	  HEENT:   Normal oral mucosa, PERRL, EOMI	  Lymphatic: No lymphadenopathy  Cardiovascular: Normal S1 S2, No JVD, + murmurs, No edema  Respiratory: Lungs clear to auscultation	  Psychiatry: A & O x 3, Mood & affect appropriate  Gastrointestinal:  Soft, Non-tender, + BS	  Skin: No rashes, No ecchymoses, No cyanosis	  Neurologic: Non-focal  Extremities: Normal range of motion, No clubbing, cyanosis or edema  Vascular: Peripheral pulses palpable 2+ bilaterally    MEDICATIONS  (STANDING):  atorvastatin 20 milliGRAM(s) Oral at bedtime  heparin   Injectable 5000 Unit(s) SubCutaneous every 12 hours  levothyroxine 50 MICROGram(s) Oral daily      TELEMETRY: 	    ECG:  	  RADIOLOGY:  OTHER: 	  	  LABS:	 	    CARDIAC MARKERS:                  proBNP: Serum Pro-Brain Natriuretic Peptide: 107 pg/mL (08-03 @ 07:57)    Lipid Profile: Cholesterol 232  LDL --  HDL 63  TG 76    HgA1c:   TSH: Thyroid Stimulating Hormone, Serum: 13.00 uIU/mL (08-07 @ 09:12)  Thyroid Stimulating Hormone, Serum: 12.20 uIU/mL (08-05 @ 10:07)  Thyroid Stimulating Hormone, Serum: 14.30 uIU/mL (08-04 @ 08:43)          Assessment and plan  ---------------------------  81 year old female with PMH hypothyroidism who presents after a fall. Patient very forgetful-attempted to reach daughter for more history but unable to so history obtained from chart as well as friend Mya ([phone]). The patient had a mechanical fall on Monday-she denies any prodromal symptoms such as dizziness, lightheadedness, chest pain, shortness of breath or palpitations. She developed a headache and was told to go to the ER by her PCP and was brought in by her daughter. The patient complains of forgetfulness. When discussed with Mya, it has been going on for 10-12 years and only includes minor issues like forgetting where she put her phone or what she ate but the patient remains mostly independent. Additionally, Mya believes that the patient is not complaint with her thyroid medication.  Upon arrival, vital signs were /79, HR 66, RR 18, temperature 98 degrees Farenheit and saturating 99% on room air. Labs and imaging were without acute findings and she was admitted for further workup and management of her fall.  pt with hx ?dementia with s/p multiple falls ?syncope  negative for  orthostatic hypotension  tsh increase adjust thyroid dose  tele so far no arrythmia  b12 level  physical therapy  echo noted , normal ef , no valvular heart disease  will consider possible tilt test as out pt  no evidence of conduction disease on tele  ?further neuro work up as out pt  asa daily  medicine noted  bp is well controlled/ continue PT  will be scheduklle for stress test as out pt  add asa daily    	        
Chief complaint:pt   asymptomatic   no  dizziness  monitor  RSR  no chest  pain . awaiting  PT  evaluation    HPI:  81 year old female with PMH hypothyroidism who presents after a fall. Patient very forgetful-attempted to reach daughter for more history but unable to so history obtained from chart as well as friend Mya ([phone]). The patient had a mechanical fall on Monday-she denies any prodromal symptoms such as dizziness, lightheadedness, chest pain, shortness of breath or palpitations. She developed a headache and was told to go to the ER by her PCP and was brought in by her daughter. The patient complains of forgetfulness. When discussed with Mya, it has been going on for 10-12 years and only includes minor issues like forgetting where she put her phone or what she ate but the patient remains mostly independent. Additionally, Almavaibhav believes that the patient is not complaint with her thyroid medication.  Upon arrival, vital signs were /79, HR 66, RR 18, temperature 98 degrees Farenheit and saturating 99% on room air. Labs and imaging were without acute findings and she was admitted for further workup and management of her fall. (03 Aug 2021 12:24)      REVIEW OF SYSTEMS:    CONSTITUTIONAL: No fever, weight loss, or fatigue  NECK: No pain or stiffness  RESPIRATORY: No cough, wheezing, chills or hemoptysis; No shortness of breath  CARDIOVASCULAR: No chest pain, palpitations, dizziness, or leg swelling  GASTROINTESTINAL: No abdominal or epigastric pain. No nausea, vomiting, or hematemesis; No diarrhea or constipation. No melena or hematochezia.  GENITOURINARY: No dysuria, frequency, hematuria, or incontinence  NEUROLOGICAL: No headaches, memory loss, loss of strength, numbness, or tremors  SKIN: No itching, burning, rashes, or lesions   LYMPH NODES: No enlarged glands  MUSCULOSKELETAL: No joint pain or swelling; No muscle, back, or extremity pain  HEME/LYMPH: No easy bruising, or bleeding gums    MEDICATIONS  (STANDING):  atorvastatin 20 milliGRAM(s) Oral at bedtime  heparin   Injectable 5000 Unit(s) SubCutaneous every 12 hours  levothyroxine 50 MICROGram(s) Oral daily    MEDICATIONS  (PRN):      Allergies    No Known Allergies    Intolerances        Vital Signs Last 24 Hrs  T(C): 36.5 (08 Aug 2021 05:24), Max: 36.8 (07 Aug 2021 12:20)  T(F): 97.7 (08 Aug 2021 05:24), Max: 98.3 (07 Aug 2021 12:20)  HR: 72 (08 Aug 2021 05:24) (70 - 73)  BP: 137/75 (08 Aug 2021 05:24) (126/73 - 137/75)  BP(mean): 92 (07 Aug 2021 20:17) (92 - 92)  RR: 16 (08 Aug 2021 05:24) (16 - 18)  SpO2: 100% (08 Aug 2021 05:24) (97% - 100%)    PHYSICAL EXAM:    GENERAL: NAD, well-groomed, well-developed  HEAD:  Atraumatic, Normocephalic  EYES: EOMI, PERRLA, conjunctiva and sclera clear  ENMT: No tonsillar erythema, exudates, or enlargement; Moist mucous membranes, Good dentition, No lesions  NECK: Supple, No JVD, Normal thyroid  NERVOUS SYSTEM:  Alert & Oriented X3, Good concentration; Motor Strength 5/5 B/L upper and lower extremities; DTRs 2+ intact and symmetric  CHEST/LUNG: Clear to percussion bilaterally; No rales, rhonchi, wheezing, or rubs  HEART: Regular rate and rhythm; No murmurs, rubs, or gallops  ABDOMEN: Soft, Nontender, Nondistended; Bowel sounds present  EXTREMITIES:  2+ Peripheral Pulses, No clubbing, cyanosis, or edema  LYMPH: No lymphadenopathy noted  SKIN: No rashes or lesions      LABS:    08-07    137  |  101  |  17  ----------------------------<  115<H>  4.2   |  23  |  0.52    Ca    9.0      07 Aug 2021 06:37            RADIOLOGY & ADDITIONAL STUDIES:
Chief complaint:pt   awke  alert , no  fever   monitor  RSR  not  orthostatic  ,  echo  normal  EF  no valvular  disease .  TSH 14  , poor compliance .. tried  to  reach  daughter  no  answer  .neurology  and  cardiac  consult  appreciated  , pt  is  medically  stable  for  discharge     HPI:  81 year old female with PMH hypothyroidism who presents after a fall. Patient very forgetful-attempted to reach daughter for more history but unable to so history obtained from chart as well as friend Darlinejulieta ([phone]). The patient had a mechanical fall on Monday-she denies any prodromal symptoms such as dizziness, lightheadedness, chest pain, shortness of breath or palpitations. She developed a headache and was told to go to the ER by her PCP and was brought in by her daughter. The patient complains of forgetfulness. When discussed with Mya, it has been going on for 10-12 years and only includes minor issues like forgetting where she put her phone or what she ate but the patient remains mostly independent. Additionally, Mya believes that the patient is not complaint with her thyroid medication.  Upon arrival, vital signs were /79, HR 66, RR 18, temperature 98 degrees Farenheit and saturating 99% on room air. Labs and imaging were without acute findings and she was admitted for further workup and management of her fall. (03 Aug 2021 12:24)      REVIEW OF SYSTEMS:    CONSTITUTIONAL: No fever, weight loss, or fatigue  NECK: No pain or stiffness  RESPIRATORY: No cough, wheezing, chills or hemoptysis; No shortness of breath  CARDIOVASCULAR: No chest pain, palpitations, dizziness, or leg swelling  GASTROINTESTINAL: No abdominal or epigastric pain. No nausea, vomiting, or hematemesis; No diarrhea or constipation. No melena or hematochezia.  GENITOURINARY: No dysuria, frequency, hematuria, or incontinence  NEUROLOGICAL: No headaches, memory loss, loss of strength, numbness, or tremors  SKIN: No itching, burning, rashes, or lesions   LYMPH NODES: No enlarged glands  MUSCULOSKELETAL: No joint pain or swelling; No muscle, back, or extremity pain  HEME/LYMPH: No easy bruising, or bleeding gums    MEDICATIONS  (STANDING):  atorvastatin 20 milliGRAM(s) Oral at bedtime  heparin   Injectable 5000 Unit(s) SubCutaneous every 12 hours  levothyroxine 50 MICROGram(s) Oral daily    MEDICATIONS  (PRN):      Allergies    No Known Allergies    Intolerances        Vital Signs Last 24 Hrs  T(C): 36.6 (05 Aug 2021 04:53), Max: 36.8 (04 Aug 2021 12:10)  T(F): 97.8 (05 Aug 2021 04:53), Max: 98.2 (04 Aug 2021 12:10)  HR: 71 (05 Aug 2021 04:53) (70 - 75)  BP: 145/77 (05 Aug 2021 04:53) (99/60 - 145/77)  BP(mean): --  RR: 18 (05 Aug 2021 04:53) (17 - 18)  SpO2: 99% (05 Aug 2021 04:53) (96% - 99%)    PHYSICAL EXAM:    GENERAL: NAD, well-groomed, well-developed  HEAD:  Atraumatic, Normocephalic  EYES: EOMI, PERRLA, conjunctiva and sclera clear  ENMT: No tonsillar erythema, exudates, or enlargement; Moist mucous membranes, Good dentition, No lesions  NECK: Supple, No JVD, Normal thyroid  NERVOUS SYSTEM:  Alert & Oriented X3, Good concentration; Motor Strength 5/5 B/L upper and lower extremities; DTRs 2+ intact and symmetric  CHEST/LUNG: Clear to percussion bilaterally; No rales, rhonchi, wheezing, or rubs  HEART: Regular rate and rhythm; No murmurs, rubs, or gallops  ABDOMEN: Soft, Nontender, Nondistended; Bowel sounds present  EXTREMITIES:  2+ Peripheral Pulses, No clubbing, cyanosis, or edema  LYMPH: No lymphadenopathy noted  SKIN: No rashes or lesions      LABS:                        12.4   6.25  )-----------( 215      ( 04 Aug 2021 05:53 )             38.9     08-04    136  |  102  |  22  ----------------------------<  129<H>  3.8   |  23  |  0.45<L>    Ca    8.7      04 Aug 2021 05:53  Phos  3.6     08-04  Mg     2.4     08-04    TPro  6.7  /  Alb  4.1  /  TBili  0.3  /  DBili  x   /  AST  21  /  ALT  18  /  AlkPhos  80  08-04      Urinalysis Basic - ( 03 Aug 2021 09:40 )    Color: Light Yellow / Appearance: Slightly Turbid / S.015 / pH: x  Gluc: x / Ketone: Negative  / Bili: Negative / Urobili: Negative   Blood: x / Protein: Trace / Nitrite: Negative   Leuk Esterase: Negative / RBC: 1 /hpf / WBC 1 /HPF   Sq Epi: x / Non Sq Epi: 1 /hpf / Bacteria: Negative        RADIOLOGY & ADDITIONAL STUDIES:
Chief complaint:pt   c/o  no  pain  no  fever , unsteady  gait  been  evaluated  by  PT   monitor  RSR     HPI:  81 year old female with PMH hypothyroidism who presents after a fall. Patient very forgetful-attempted to reach daughter for more history but unable to so history obtained from chart as well as friend Mya ([phone]). The patient had a mechanical fall on Monday-she denies any prodromal symptoms such as dizziness, lightheadedness, chest pain, shortness of breath or palpitations. She developed a headache and was told to go to the ER by her PCP and was brought in by her daughter. The patient complains of forgetfulness. When discussed with Mya, it has been going on for 10-12 years and only includes minor issues like forgetting where she put her phone or what she ate but the patient remains mostly independent. Additionally, Mya believes that the patient is not complaint with her thyroid medication.  Upon arrival, vital signs were /79, HR 66, RR 18, temperature 98 degrees Farenheit and saturating 99% on room air. Labs and imaging were without acute findings and she was admitted for further workup and management of her fall. (03 Aug 2021 12:24)      REVIEW OF SYSTEMS:    CONSTITUTIONAL: No fever, weight loss, or fatigue  NECK: No pain or stiffness  RESPIRATORY: No cough, wheezing, chills or hemoptysis; No shortness of breath  CARDIOVASCULAR: No chest pain, palpitations, dizziness, or leg swelling  GASTROINTESTINAL: No abdominal or epigastric pain. No nausea, vomiting, or hematemesis; No diarrhea or constipation. No melena or hematochezia.  GENITOURINARY: No dysuria, frequency, hematuria, or incontinence  NEUROLOGICAL: No headaches, memory loss, loss of strength, numbness, or tremors  SKIN: No itching, burning, rashes, or lesions   LYMPH NODES: No enlarged glands  MUSCULOSKELETAL: No joint pain or swelling; No muscle, back, or extremity pain  HEME/LYMPH: No easy bruising, or bleeding gums    MEDICATIONS  (STANDING):  aspirin enteric coated 81 milliGRAM(s) Oral daily  atorvastatin 20 milliGRAM(s) Oral at bedtime  heparin   Injectable 5000 Unit(s) SubCutaneous every 12 hours  levothyroxine 50 MICROGram(s) Oral daily    MEDICATIONS  (PRN):      Allergies    No Known Allergies    Intolerances        Vital Signs Last 24 Hrs  T(C): 36.6 (09 Aug 2021 04:08), Max: 36.8 (08 Aug 2021 14:30)  T(F): 97.8 (09 Aug 2021 04:08), Max: 98.2 (08 Aug 2021 14:30)  HR: 76 (09 Aug 2021 04:08) (72 - 82)  BP: 144/79 (09 Aug 2021 04:08) (116/57 - 144/79)  BP(mean): 87 (08 Aug 2021 20:36) (87 - 87)  RR: 18 (09 Aug 2021 04:08) (18 - 20)  SpO2: 99% (09 Aug 2021 04:08) (99% - 99%)    PHYSICAL EXAM:    GENERAL: NAD, well-groomed, well-developed  HEAD:  Atraumatic, Normocephalic  EYES: EOMI, PERRLA, conjunctiva and sclera clear  ENMT: No tonsillar erythema, exudates, or enlargement; Moist mucous membranes, Good dentition, No lesions  NECK: Supple, No JVD, Normal thyroid  NERVOUS SYSTEM:  Alert & Oriented X3, Good concentration; Motor Strength 5/5 B/L upper and lower extremities; DTRs 2+ intact and symmetric  CHEST/LUNG: Clear to percussion bilaterally; No rales, rhonchi, wheezing, or rubs  HEART: Regular rate and rhythm; No murmurs, rubs, or gallops  ABDOMEN: Soft, Nontender, Nondistended; Bowel sounds present  EXTREMITIES:  2+ Peripheral Pulses, No clubbing, cyanosis, or edema  LYMPH: No lymphadenopathy noted  SKIN: No rashes or lesions      LABS:                RADIOLOGY & ADDITIONAL STUDIES:
Chief complaint:pt   awake   alert  this  AM  no  pain  or  dizziness , labs  reviwed normal . monitor  RSR  CT   head  no mases  or  bleeding  , unable  to  reach  daughter  to   discuss  , yasmine reyna  is  negative ,.pt   c/o  forgetfull  of  names  and  places  .     HPI:  81 year old female with PMH hypothyroidism who presents after a fall. Patient very forgetful-attempted to reach daughter for more history but unable to so history obtained from chart as well as friend Almavaibhav ([phone]). The patient had a mechanical fall on Monday-she denies any prodromal symptoms such as dizziness, lightheadedness, chest pain, shortness of breath or palpitations. She developed a headache and was told to go to the ER by her PCP and was brought in by her daughter. The patient complains of forgetfulness. When discussed with Mya, it has been going on for 10-12 years and only includes minor issues like forgetting where she put her phone or what she ate but the patient remains mostly independent. Additionally, Mya believes that the patient is not complaint with her thyroid medication.  Upon arrival, vital signs were /79, HR 66, RR 18, temperature 98 degrees Farenheit and saturating 99% on room air. Labs and imaging were without acute findings and she was admitted for further workup and management of her fall. (03 Aug 2021 12:24)      REVIEW OF SYSTEMS:    CONSTITUTIONAL: No fever, weight loss, or fatigue  NECK: No pain or stiffness  RESPIRATORY: No cough, wheezing, chills or hemoptysis; No shortness of breath  CARDIOVASCULAR: No chest pain, palpitations, dizziness, or leg swelling  GASTROINTESTINAL: No abdominal or epigastric pain. No nausea, vomiting, or hematemesis; No diarrhea or constipation. No melena or hematochezia.  GENITOURINARY: No dysuria, frequency, hematuria, or incontinence  NEUROLOGICAL: No headaches, memory loss, loss of strength, numbness, or tremors  SKIN: No itching, burning, rashes, or lesions   LYMPH NODES: No enlarged glands  MUSCULOSKELETAL: No joint pain or swelling; No muscle, back, or extremity pain  HEME/LYMPH: No easy bruising, or bleeding gums    MEDICATIONS  (STANDING):  levothyroxine 50 MICROGram(s) Oral daily    MEDICATIONS  (PRN):      Allergies    No Known Allergies    Intolerances        Vital Signs Last 24 Hrs  T(C): 36.5 (04 Aug 2021 05:18), Max: 36.7 (03 Aug 2021 16:20)  T(F): 97.7 (04 Aug 2021 05:18), Max: 98 (03 Aug 2021 16:20)  HR: 70 (04 Aug 2021 05:18) (70 - 78)  BP: 124/67 (04 Aug 2021 05:18) (100/60 - 124/67)  BP(mean): --  RR: 18 (04 Aug 2021 05:18) (16 - 18)  SpO2: 98% (04 Aug 2021 05:18) (96% - 100%)    PHYSICAL EXAM:    GENERAL: NAD, well-groomed, well-developed  HEAD:  Atraumatic, Normocephalic  EYES: EOMI, PERRLA, conjunctiva and sclera clear  ENMT: No tonsillar erythema, exudates, or enlargement; Moist mucous membranes, Good dentition, No lesions  NECK: Supple, No JVD, Normal thyroid  NERVOUS SYSTEM:  Alert & Oriented X3, Good concentration; Motor Strength 5/5 B/L upper and lower extremities; DTRs 2+ intact and symmetric  CHEST/LUNG: Clear to percussion bilaterally; No rales, rhonchi, wheezing, or rubs  HEART: Regular rate and rhythm; No murmurs, rubs, or gallops  ABDOMEN: Soft, Nontender, Nondistended; Bowel sounds present  EXTREMITIES:  2+ Peripheral Pulses, No clubbing, cyanosis, or edema  LYMPH: No lymphadenopathy noted  SKIN: No rashes or lesions      LABS:                        12.4   6.25  )-----------( 215      ( 04 Aug 2021 05:53 )             38.9     08-04    136  |  102  |  22  ----------------------------<  129<H>  3.8   |  23  |  0.45<L>    Ca    8.7      04 Aug 2021 05:53  Phos  3.6     08-04  Mg     2.4     08-04    TPro  6.7  /  Alb  4.1  /  TBili  0.3  /  DBili  x   /  AST  21  /  ALT  18  /  AlkPhos  80  08-04      Urinalysis Basic - ( 03 Aug 2021 09:40 )    Color: Light Yellow / Appearance: Slightly Turbid / S.015 / pH: x  Gluc: x / Ketone: Negative  / Bili: Negative / Urobili: Negative   Blood: x / Protein: Trace / Nitrite: Negative   Leuk Esterase: Negative / RBC: 1 /hpf / WBC 1 /HPF   Sq Epi: x / Non Sq Epi: 1 /hpf / Bacteria: Negative        RADIOLOGY & ADDITIONAL STUDIES:

## 2022-01-29 NOTE — H&P ADULT - ASSESSMENT
[FreeTextEntry1] : blood work oredered including tsh, lyme etc\par referral to neurology and f/u cardiology re: tachycardia, has seen Dr. Mireles in the past\par RTO for f/u appointment 2-4 weeks, will continue f/u with ENT
Niya Deleon, 78 F, PMH of HLP, hypothyroid,  coming in with a mechanical fall down 6 stairs on tuesday. Comes in today because pain has not gotten better. Denies radiculopathy, numbness, paresthesias, weakness. CT showed significant type 2 dens fx with c1 arch fx.     Plan: spine precautions, c collar, MRI c spine, hospitalist called for medications (states she does not know which meds she is on)

## 2023-09-28 NOTE — CONSULT NOTE ADULT - PROBLEM SELECTOR RECOMMENDATION 2
I reviewed the H&P, I examined the patient, and there are no changes in the patient's condition.    
Continue levothyroxine

## 2023-12-26 NOTE — ED ADULT TRIAGE NOTE - SOURCE OF INFORMATION
Caller: Patient    Doctor: Kaushal    Reason for call:     Patient is calling about his recent surgery on right shoulder, he is still feeling numbness in   Right hand, he states he is not having control of arm from elbow up to shoulder, when he pinches his fingers he cannot feel anything.   He is asking if this is normal.  He is asking for a call back to discuss this    Call back#: [phone]   Patient